# Patient Record
Sex: FEMALE | Race: WHITE | Employment: PART TIME | URBAN - METROPOLITAN AREA
[De-identification: names, ages, dates, MRNs, and addresses within clinical notes are randomized per-mention and may not be internally consistent; named-entity substitution may affect disease eponyms.]

---

## 2017-02-03 ENCOUNTER — TRANSFERRED RECORDS (OUTPATIENT)
Dept: HEALTH INFORMATION MANAGEMENT | Facility: CLINIC | Age: 67
End: 2017-02-03

## 2017-02-10 RX ORDER — ALBUTEROL SULFATE 90 UG/1
2 AEROSOL, METERED RESPIRATORY (INHALATION) EVERY 6 HOURS PRN
COMMUNITY
End: 2021-12-17

## 2017-02-13 ENCOUNTER — APPOINTMENT (OUTPATIENT)
Dept: PHYSICAL THERAPY | Facility: CLINIC | Age: 67
DRG: 470 | End: 2017-02-13
Attending: ORTHOPAEDIC SURGERY
Payer: MEDICARE

## 2017-02-13 ENCOUNTER — HOSPITAL ENCOUNTER (INPATIENT)
Facility: CLINIC | Age: 67
LOS: 3 days | Discharge: HOME | DRG: 470 | End: 2017-02-16
Attending: ORTHOPAEDIC SURGERY | Admitting: ORTHOPAEDIC SURGERY
Payer: MEDICARE

## 2017-02-13 ENCOUNTER — APPOINTMENT (OUTPATIENT)
Dept: GENERAL RADIOLOGY | Facility: CLINIC | Age: 67
DRG: 470 | End: 2017-02-13
Attending: ORTHOPAEDIC SURGERY
Payer: MEDICARE

## 2017-02-13 ENCOUNTER — ANESTHESIA (OUTPATIENT)
Dept: SURGERY | Facility: CLINIC | Age: 67
DRG: 470 | End: 2017-02-13
Payer: MEDICARE

## 2017-02-13 ENCOUNTER — ANESTHESIA EVENT (OUTPATIENT)
Dept: SURGERY | Facility: CLINIC | Age: 67
DRG: 470 | End: 2017-02-13
Payer: MEDICARE

## 2017-02-13 DIAGNOSIS — Z96.659 STATUS POST TOTAL KNEE REPLACEMENT, UNSPECIFIED LATERALITY: Primary | ICD-10-CM

## 2017-02-13 LAB
ANION GAP SERPL CALCULATED.3IONS-SCNC: 6 MMOL/L (ref 3–14)
BUN SERPL-MCNC: 13 MG/DL (ref 7–30)
CALCIUM SERPL-MCNC: 8.7 MG/DL (ref 8.5–10.1)
CHLORIDE SERPL-SCNC: 110 MMOL/L (ref 94–109)
CO2 SERPL-SCNC: 26 MMOL/L (ref 20–32)
CREAT SERPL-MCNC: 0.81 MG/DL (ref 0.52–1.04)
GFR SERPL CREATININE-BSD FRML MDRD: 71 ML/MIN/1.7M2
GLUCOSE SERPL-MCNC: 102 MG/DL (ref 70–99)
HGB BLD-MCNC: 13 G/DL (ref 11.7–15.7)
PLATELET # BLD AUTO: 253 10E9/L (ref 150–450)
POTASSIUM SERPL-SCNC: 4.2 MMOL/L (ref 3.4–5.3)
SODIUM SERPL-SCNC: 142 MMOL/L (ref 133–144)

## 2017-02-13 PROCEDURE — 27810169 ZZH OR IMPLANT GENERAL: Performed by: ORTHOPAEDIC SURGERY

## 2017-02-13 PROCEDURE — 25000128 H RX IP 250 OP 636: Performed by: ANESTHESIOLOGY

## 2017-02-13 PROCEDURE — 25800025 ZZH RX 258: Performed by: ANESTHESIOLOGY

## 2017-02-13 PROCEDURE — 25000125 ZZHC RX 250: Performed by: NURSE ANESTHETIST, CERTIFIED REGISTERED

## 2017-02-13 PROCEDURE — 97530 THERAPEUTIC ACTIVITIES: CPT | Mod: GP | Performed by: PHYSICAL THERAPIST

## 2017-02-13 PROCEDURE — 97110 THERAPEUTIC EXERCISES: CPT | Mod: GP | Performed by: PHYSICAL THERAPIST

## 2017-02-13 PROCEDURE — 36000093 ZZH SURGERY LEVEL 4 1ST 30 MIN: Performed by: ORTHOPAEDIC SURGERY

## 2017-02-13 PROCEDURE — 25000128 H RX IP 250 OP 636: Performed by: ORTHOPAEDIC SURGERY

## 2017-02-13 PROCEDURE — 36415 COLL VENOUS BLD VENIPUNCTURE: CPT | Performed by: ORTHOPAEDIC SURGERY

## 2017-02-13 PROCEDURE — A9270 NON-COVERED ITEM OR SERVICE: HCPCS | Mod: GY | Performed by: ORTHOPAEDIC SURGERY

## 2017-02-13 PROCEDURE — 0QBB0ZZ EXCISION OF RIGHT LOWER FEMUR, OPEN APPROACH: ICD-10-PCS | Performed by: ORTHOPAEDIC SURGERY

## 2017-02-13 PROCEDURE — 93005 ELECTROCARDIOGRAM TRACING: CPT

## 2017-02-13 PROCEDURE — C1776 JOINT DEVICE (IMPLANTABLE): HCPCS | Performed by: ORTHOPAEDIC SURGERY

## 2017-02-13 PROCEDURE — 36000063 ZZH SURGERY LEVEL 4 EA 15 ADDTL MIN: Performed by: ORTHOPAEDIC SURGERY

## 2017-02-13 PROCEDURE — 25000132 ZZH RX MED GY IP 250 OP 250 PS 637: Mod: GY | Performed by: PHYSICIAN ASSISTANT

## 2017-02-13 PROCEDURE — 25000128 H RX IP 250 OP 636: Performed by: NURSE ANESTHETIST, CERTIFIED REGISTERED

## 2017-02-13 PROCEDURE — 25000125 ZZHC RX 250: Performed by: ANESTHESIOLOGY

## 2017-02-13 PROCEDURE — 27810325 ZZHC OR IMPLANT OTHER OPNP: Performed by: ORTHOPAEDIC SURGERY

## 2017-02-13 PROCEDURE — 25000125 ZZHC RX 250: Performed by: ORTHOPAEDIC SURGERY

## 2017-02-13 PROCEDURE — 80048 BASIC METABOLIC PNL TOTAL CA: CPT | Performed by: ORTHOPAEDIC SURGERY

## 2017-02-13 PROCEDURE — 25800025 ZZH RX 258: Performed by: ORTHOPAEDIC SURGERY

## 2017-02-13 PROCEDURE — 0Y9F0ZZ DRAINAGE OF RIGHT KNEE REGION, OPEN APPROACH: ICD-10-PCS | Performed by: ORTHOPAEDIC SURGERY

## 2017-02-13 PROCEDURE — 99222 1ST HOSP IP/OBS MODERATE 55: CPT | Performed by: PHYSICIAN ASSISTANT

## 2017-02-13 PROCEDURE — 0SRC0J9 REPLACEMENT OF RIGHT KNEE JOINT WITH SYNTHETIC SUBSTITUTE, CEMENTED, OPEN APPROACH: ICD-10-PCS | Performed by: ORTHOPAEDIC SURGERY

## 2017-02-13 PROCEDURE — 85049 AUTOMATED PLATELET COUNT: CPT | Performed by: ORTHOPAEDIC SURGERY

## 2017-02-13 PROCEDURE — 37000008 ZZH ANESTHESIA TECHNICAL FEE, 1ST 30 MIN: Performed by: ORTHOPAEDIC SURGERY

## 2017-02-13 PROCEDURE — 93010 ELECTROCARDIOGRAM REPORT: CPT | Performed by: INTERNAL MEDICINE

## 2017-02-13 PROCEDURE — 40000940 XR KNEE PORT RT 1/2 VW: Mod: RT

## 2017-02-13 PROCEDURE — 40000193 ZZH STATISTIC PT WARD VISIT: Performed by: PHYSICAL THERAPIST

## 2017-02-13 PROCEDURE — 85018 HEMOGLOBIN: CPT | Performed by: ORTHOPAEDIC SURGERY

## 2017-02-13 PROCEDURE — A9270 NON-COVERED ITEM OR SERVICE: HCPCS | Mod: GY | Performed by: PHYSICIAN ASSISTANT

## 2017-02-13 PROCEDURE — 71000012 ZZH RECOVERY PHASE 1 LEVEL 1 FIRST HR: Performed by: ORTHOPAEDIC SURGERY

## 2017-02-13 PROCEDURE — 37000009 ZZH ANESTHESIA TECHNICAL FEE, EACH ADDTL 15 MIN: Performed by: ORTHOPAEDIC SURGERY

## 2017-02-13 PROCEDURE — 97161 PT EVAL LOW COMPLEX 20 MIN: CPT | Mod: GP | Performed by: PHYSICAL THERAPIST

## 2017-02-13 PROCEDURE — 27110028 ZZH OR GENERAL SUPPLY NON-STERILE: Performed by: ORTHOPAEDIC SURGERY

## 2017-02-13 PROCEDURE — 27210995 ZZH RX 272: Performed by: ORTHOPAEDIC SURGERY

## 2017-02-13 PROCEDURE — 27210794 ZZH OR GENERAL SUPPLY STERILE: Performed by: ORTHOPAEDIC SURGERY

## 2017-02-13 PROCEDURE — 25000132 ZZH RX MED GY IP 250 OP 250 PS 637: Mod: GY | Performed by: ORTHOPAEDIC SURGERY

## 2017-02-13 PROCEDURE — 12000000 ZZH R&B MED SURG/OB

## 2017-02-13 PROCEDURE — 40000171 ZZH STATISTIC PRE-PROCEDURE ASSESSMENT III: Performed by: ORTHOPAEDIC SURGERY

## 2017-02-13 DEVICE — BONE CEMENT RADIOPAQUE SIMPLEX HV FULL DOSE 6194-1-001: Type: IMPLANTABLE DEVICE | Site: KNEE | Status: FUNCTIONAL

## 2017-02-13 DEVICE — IMP BASEPLATE TIBIAL GENESIS II SZ 3 RT TI 71420182: Type: IMPLANTABLE DEVICE | Site: KNEE | Status: FUNCTIONAL

## 2017-02-13 DEVICE — IMP COMP PATELLA SNR GENESIS II 9X32MM 71420576: Type: IMPLANTABLE DEVICE | Site: KNEE | Status: FUNCTIONAL

## 2017-02-13 DEVICE — IMP COMP FEMORAL S&N LEGION CR NP NARROW 5 RT 71933646: Type: IMPLANTABLE DEVICE | Site: KNEE | Status: FUNCTIONAL

## 2017-02-13 RX ORDER — PROCHLORPERAZINE MALEATE 5 MG
5 TABLET ORAL EVERY 6 HOURS PRN
Status: DISCONTINUED | OUTPATIENT
Start: 2017-02-13 | End: 2017-02-16 | Stop reason: HOSPADM

## 2017-02-13 RX ORDER — FENTANYL CITRATE 50 UG/ML
25-50 INJECTION, SOLUTION INTRAMUSCULAR; INTRAVENOUS
Status: COMPLETED | OUTPATIENT
Start: 2017-02-13 | End: 2017-02-13

## 2017-02-13 RX ORDER — LIDOCAINE 40 MG/G
CREAM TOPICAL
Status: DISCONTINUED | OUTPATIENT
Start: 2017-02-13 | End: 2017-02-16 | Stop reason: HOSPADM

## 2017-02-13 RX ORDER — MAGNESIUM HYDROXIDE 1200 MG/15ML
LIQUID ORAL PRN
Status: DISCONTINUED | OUTPATIENT
Start: 2017-02-13 | End: 2017-02-13 | Stop reason: HOSPADM

## 2017-02-13 RX ORDER — EPHEDRINE SULFATE 50 MG/ML
INJECTION, SOLUTION INTRAMUSCULAR; INTRAVENOUS; SUBCUTANEOUS PRN
Status: DISCONTINUED | OUTPATIENT
Start: 2017-02-13 | End: 2017-02-13

## 2017-02-13 RX ORDER — ONDANSETRON 2 MG/ML
4 INJECTION INTRAMUSCULAR; INTRAVENOUS EVERY 6 HOURS PRN
Status: DISCONTINUED | OUTPATIENT
Start: 2017-02-13 | End: 2017-02-16 | Stop reason: HOSPADM

## 2017-02-13 RX ORDER — ONDANSETRON 4 MG/1
4 TABLET, ORALLY DISINTEGRATING ORAL EVERY 30 MIN PRN
Status: DISCONTINUED | OUTPATIENT
Start: 2017-02-13 | End: 2017-02-13

## 2017-02-13 RX ORDER — ONDANSETRON 2 MG/ML
4 INJECTION INTRAMUSCULAR; INTRAVENOUS EVERY 30 MIN PRN
Status: DISCONTINUED | OUTPATIENT
Start: 2017-02-13 | End: 2017-02-13

## 2017-02-13 RX ORDER — GLYCOPYRROLATE 0.2 MG/ML
INJECTION, SOLUTION INTRAMUSCULAR; INTRAVENOUS PRN
Status: DISCONTINUED | OUTPATIENT
Start: 2017-02-13 | End: 2017-02-13

## 2017-02-13 RX ORDER — DEXTROSE MONOHYDRATE, SODIUM CHLORIDE, AND POTASSIUM CHLORIDE 50; 1.49; 4.5 G/1000ML; G/1000ML; G/1000ML
INJECTION, SOLUTION INTRAVENOUS CONTINUOUS
Status: DISCONTINUED | OUTPATIENT
Start: 2017-02-13 | End: 2017-02-16

## 2017-02-13 RX ORDER — NALOXONE HYDROCHLORIDE 0.4 MG/ML
.1-.4 INJECTION, SOLUTION INTRAMUSCULAR; INTRAVENOUS; SUBCUTANEOUS
Status: DISCONTINUED | OUTPATIENT
Start: 2017-02-13 | End: 2017-02-16 | Stop reason: HOSPADM

## 2017-02-13 RX ORDER — ALPRAZOLAM 0.5 MG
0.5 TABLET ORAL 3 TIMES DAILY PRN
Status: DISCONTINUED | OUTPATIENT
Start: 2017-02-13 | End: 2017-02-13

## 2017-02-13 RX ORDER — AMOXICILLIN 250 MG
1-2 CAPSULE ORAL 2 TIMES DAILY PRN
Status: DISCONTINUED | OUTPATIENT
Start: 2017-02-13 | End: 2017-02-16 | Stop reason: HOSPADM

## 2017-02-13 RX ORDER — ACETAMINOPHEN 325 MG/1
650 TABLET ORAL EVERY 4 HOURS PRN
Status: DISCONTINUED | OUTPATIENT
Start: 2017-02-16 | End: 2017-02-16 | Stop reason: HOSPADM

## 2017-02-13 RX ORDER — AMOXICILLIN 250 MG
1-2 CAPSULE ORAL 2 TIMES DAILY
Status: DISCONTINUED | OUTPATIENT
Start: 2017-02-13 | End: 2017-02-16 | Stop reason: HOSPADM

## 2017-02-13 RX ORDER — DIPHENHYDRAMINE HYDROCHLORIDE 50 MG/ML
12.5 INJECTION INTRAMUSCULAR; INTRAVENOUS EVERY 6 HOURS PRN
Status: DISCONTINUED | OUTPATIENT
Start: 2017-02-13 | End: 2017-02-16 | Stop reason: HOSPADM

## 2017-02-13 RX ORDER — FENTANYL CITRATE 0.05 MG/ML
25-50 INJECTION, SOLUTION INTRAMUSCULAR; INTRAVENOUS
Status: DISCONTINUED | OUTPATIENT
Start: 2017-02-13 | End: 2017-02-13

## 2017-02-13 RX ORDER — SODIUM CHLORIDE, SODIUM LACTATE, POTASSIUM CHLORIDE, CALCIUM CHLORIDE 600; 310; 30; 20 MG/100ML; MG/100ML; MG/100ML; MG/100ML
INJECTION, SOLUTION INTRAVENOUS CONTINUOUS
Status: DISCONTINUED | OUTPATIENT
Start: 2017-02-13 | End: 2017-02-13

## 2017-02-13 RX ORDER — ONDANSETRON 4 MG/1
4 TABLET, ORALLY DISINTEGRATING ORAL EVERY 6 HOURS PRN
Status: DISCONTINUED | OUTPATIENT
Start: 2017-02-13 | End: 2017-02-16 | Stop reason: HOSPADM

## 2017-02-13 RX ORDER — BISACODYL 10 MG
10 SUPPOSITORY, RECTAL RECTAL DAILY PRN
Status: DISCONTINUED | OUTPATIENT
Start: 2017-02-13 | End: 2017-02-16 | Stop reason: HOSPADM

## 2017-02-13 RX ORDER — POLYETHYLENE GLYCOL 3350 17 G/17G
17 POWDER, FOR SOLUTION ORAL DAILY PRN
Status: DISCONTINUED | OUTPATIENT
Start: 2017-02-13 | End: 2017-02-16 | Stop reason: HOSPADM

## 2017-02-13 RX ORDER — ALBUTEROL SULFATE 90 UG/1
2 AEROSOL, METERED RESPIRATORY (INHALATION) EVERY 6 HOURS PRN
Status: DISCONTINUED | OUTPATIENT
Start: 2017-02-13 | End: 2017-02-16 | Stop reason: HOSPADM

## 2017-02-13 RX ORDER — ALPRAZOLAM 0.5 MG
0.5 TABLET ORAL
Status: DISCONTINUED | OUTPATIENT
Start: 2017-02-13 | End: 2017-02-16 | Stop reason: HOSPADM

## 2017-02-13 RX ORDER — CEFAZOLIN SODIUM 1 G/3ML
1 INJECTION, POWDER, FOR SOLUTION INTRAMUSCULAR; INTRAVENOUS SEE ADMIN INSTRUCTIONS
Status: DISCONTINUED | OUTPATIENT
Start: 2017-02-13 | End: 2017-02-13 | Stop reason: HOSPADM

## 2017-02-13 RX ORDER — SODIUM CHLORIDE, SODIUM LACTATE, POTASSIUM CHLORIDE, CALCIUM CHLORIDE 600; 310; 30; 20 MG/100ML; MG/100ML; MG/100ML; MG/100ML
INJECTION, SOLUTION INTRAVENOUS CONTINUOUS
Status: DISCONTINUED | OUTPATIENT
Start: 2017-02-13 | End: 2017-02-13 | Stop reason: HOSPADM

## 2017-02-13 RX ORDER — CYCLOBENZAPRINE HCL 5 MG
5 TABLET ORAL 3 TIMES DAILY PRN
Status: DISCONTINUED | OUTPATIENT
Start: 2017-02-13 | End: 2017-02-16 | Stop reason: HOSPADM

## 2017-02-13 RX ORDER — BUPIVACAINE HYDROCHLORIDE 7.5 MG/ML
INJECTION, SOLUTION INTRASPINAL PRN
Status: DISCONTINUED | OUTPATIENT
Start: 2017-02-13 | End: 2017-02-13

## 2017-02-13 RX ORDER — FENTANYL CITRATE 50 UG/ML
INJECTION, SOLUTION INTRAMUSCULAR; INTRAVENOUS PRN
Status: DISCONTINUED | OUTPATIENT
Start: 2017-02-13 | End: 2017-02-13

## 2017-02-13 RX ORDER — CEFAZOLIN SODIUM 2 G/100ML
2 INJECTION, SOLUTION INTRAVENOUS
Status: COMPLETED | OUTPATIENT
Start: 2017-02-13 | End: 2017-02-13

## 2017-02-13 RX ORDER — AMOXICILLIN 250 MG
1-2 CAPSULE ORAL 2 TIMES DAILY
Status: DISCONTINUED | OUTPATIENT
Start: 2017-02-13 | End: 2017-02-13

## 2017-02-13 RX ORDER — PROPOFOL 10 MG/ML
INJECTION, EMULSION INTRAVENOUS PRN
Status: DISCONTINUED | OUTPATIENT
Start: 2017-02-13 | End: 2017-02-13

## 2017-02-13 RX ORDER — KETOROLAC TROMETHAMINE 15 MG/ML
15 INJECTION, SOLUTION INTRAMUSCULAR; INTRAVENOUS EVERY 6 HOURS
Status: COMPLETED | OUTPATIENT
Start: 2017-02-13 | End: 2017-02-14

## 2017-02-13 RX ORDER — DIPHENHYDRAMINE HCL 12.5MG/5ML
12.5 LIQUID (ML) ORAL EVERY 6 HOURS PRN
Status: DISCONTINUED | OUTPATIENT
Start: 2017-02-13 | End: 2017-02-16 | Stop reason: HOSPADM

## 2017-02-13 RX ORDER — HYDROMORPHONE HYDROCHLORIDE 1 MG/ML
.3-.5 INJECTION, SOLUTION INTRAMUSCULAR; INTRAVENOUS; SUBCUTANEOUS
Status: DISCONTINUED | OUTPATIENT
Start: 2017-02-13 | End: 2017-02-16 | Stop reason: HOSPADM

## 2017-02-13 RX ORDER — OXYCODONE HYDROCHLORIDE 5 MG/1
5-10 TABLET ORAL EVERY 4 HOURS PRN
Status: DISCONTINUED | OUTPATIENT
Start: 2017-02-13 | End: 2017-02-14

## 2017-02-13 RX ORDER — BISACODYL 10 MG
10 SUPPOSITORY, RECTAL RECTAL DAILY PRN
Status: DISCONTINUED | OUTPATIENT
Start: 2017-02-13 | End: 2017-02-13

## 2017-02-13 RX ORDER — ACETAMINOPHEN 325 MG/1
975 TABLET ORAL EVERY 8 HOURS
Status: DISCONTINUED | OUTPATIENT
Start: 2017-02-13 | End: 2017-02-16 | Stop reason: HOSPADM

## 2017-02-13 RX ORDER — LABETALOL HYDROCHLORIDE 5 MG/ML
10 INJECTION, SOLUTION INTRAVENOUS
Status: DISCONTINUED | OUTPATIENT
Start: 2017-02-13 | End: 2017-02-13

## 2017-02-13 RX ORDER — PROPOFOL 10 MG/ML
INJECTION, EMULSION INTRAVENOUS CONTINUOUS PRN
Status: DISCONTINUED | OUTPATIENT
Start: 2017-02-13 | End: 2017-02-13

## 2017-02-13 RX ORDER — CEFAZOLIN SODIUM 1 G/3ML
1 INJECTION, POWDER, FOR SOLUTION INTRAMUSCULAR; INTRAVENOUS EVERY 8 HOURS
Status: COMPLETED | OUTPATIENT
Start: 2017-02-13 | End: 2017-02-14

## 2017-02-13 RX ADMIN — HYDROMORPHONE HYDROCHLORIDE 0.5 MG: 1 INJECTION, SOLUTION INTRAMUSCULAR; INTRAVENOUS; SUBCUTANEOUS at 12:17

## 2017-02-13 RX ADMIN — ACETAMINOPHEN 975 MG: 325 TABLET, FILM COATED ORAL at 22:02

## 2017-02-13 RX ADMIN — POTASSIUM CHLORIDE, DEXTROSE MONOHYDRATE AND SODIUM CHLORIDE: 150; 5; 450 INJECTION, SOLUTION INTRAVENOUS at 14:34

## 2017-02-13 RX ADMIN — PHENYLEPHRINE HYDROCHLORIDE 100 MCG: 10 INJECTION, SOLUTION INTRAMUSCULAR; INTRAVENOUS; SUBCUTANEOUS at 11:27

## 2017-02-13 RX ADMIN — MIDAZOLAM HYDROCHLORIDE 1 MG: 1 INJECTION, SOLUTION INTRAMUSCULAR; INTRAVENOUS at 09:27

## 2017-02-13 RX ADMIN — MIDAZOLAM HYDROCHLORIDE 1 MG: 1 INJECTION, SOLUTION INTRAMUSCULAR; INTRAVENOUS at 09:32

## 2017-02-13 RX ADMIN — ROPIVACAINE HYDROCHLORIDE 15 ML: 5 INJECTION, SOLUTION EPIDURAL; INFILTRATION; PERINEURAL at 09:30

## 2017-02-13 RX ADMIN — SODIUM CHLORIDE, POTASSIUM CHLORIDE, SODIUM LACTATE AND CALCIUM CHLORIDE: 600; 310; 30; 20 INJECTION, SOLUTION INTRAVENOUS at 09:08

## 2017-02-13 RX ADMIN — LIDOCAINE HYDROCHLORIDE 1 ML: 10 INJECTION, SOLUTION EPIDURAL; INFILTRATION; INTRACAUDAL; PERINEURAL at 09:07

## 2017-02-13 RX ADMIN — CEFAZOLIN SODIUM 2 G: 2 INJECTION, SOLUTION INTRAVENOUS at 10:01

## 2017-02-13 RX ADMIN — OXYCODONE HYDROCHLORIDE 10 MG: 5 TABLET ORAL at 18:51

## 2017-02-13 RX ADMIN — SENNOSIDES AND DOCUSATE SODIUM 1 TABLET: 8.6; 5 TABLET ORAL at 20:22

## 2017-02-13 RX ADMIN — PHENYLEPHRINE HYDROCHLORIDE 100 MCG: 10 INJECTION, SOLUTION INTRAMUSCULAR; INTRAVENOUS; SUBCUTANEOUS at 10:51

## 2017-02-13 RX ADMIN — PHENYLEPHRINE HYDROCHLORIDE 100 MCG: 10 INJECTION, SOLUTION INTRAMUSCULAR; INTRAVENOUS; SUBCUTANEOUS at 10:14

## 2017-02-13 RX ADMIN — KETOROLAC TROMETHAMINE 15 MG: 15 INJECTION, SOLUTION INTRAMUSCULAR; INTRAVENOUS at 20:22

## 2017-02-13 RX ADMIN — FENTANYL CITRATE 50 MCG: 50 INJECTION, SOLUTION INTRAMUSCULAR; INTRAVENOUS at 09:33

## 2017-02-13 RX ADMIN — FENTANYL CITRATE 50 MCG: 50 INJECTION, SOLUTION INTRAMUSCULAR; INTRAVENOUS at 09:29

## 2017-02-13 RX ADMIN — BUPIVACAINE HYDROCHLORIDE IN DEXTROSE 13.5 MG: 7.5 INJECTION, SOLUTION SUBARACHNOID at 10:05

## 2017-02-13 RX ADMIN — PROPOFOL 100 MCG/KG/MIN: 10 INJECTION, EMULSION INTRAVENOUS at 10:06

## 2017-02-13 RX ADMIN — FENTANYL CITRATE 50 MCG: 50 INJECTION, SOLUTION INTRAMUSCULAR; INTRAVENOUS at 12:11

## 2017-02-13 RX ADMIN — PHENYLEPHRINE HYDROCHLORIDE 100 MCG: 10 INJECTION, SOLUTION INTRAMUSCULAR; INTRAVENOUS; SUBCUTANEOUS at 11:34

## 2017-02-13 RX ADMIN — Medication 10 MG: at 10:18

## 2017-02-13 RX ADMIN — CEFAZOLIN SODIUM 1 G: 1 INJECTION, POWDER, FOR SOLUTION INTRAMUSCULAR; INTRAVENOUS at 18:51

## 2017-02-13 RX ADMIN — HYDROMORPHONE HYDROCHLORIDE 0.5 MG: 1 INJECTION, SOLUTION INTRAMUSCULAR; INTRAVENOUS; SUBCUTANEOUS at 16:46

## 2017-02-13 RX ADMIN — HYDROMORPHONE HYDROCHLORIDE 0.5 MG: 1 INJECTION, SOLUTION INTRAMUSCULAR; INTRAVENOUS; SUBCUTANEOUS at 12:29

## 2017-02-13 RX ADMIN — TRANEXAMIC ACID 1 G: 100 INJECTION, SOLUTION INTRAVENOUS at 11:17

## 2017-02-13 RX ADMIN — OXYCODONE HYDROCHLORIDE 10 MG: 5 TABLET ORAL at 22:54

## 2017-02-13 RX ADMIN — PHENYLEPHRINE HYDROCHLORIDE 100 MCG: 10 INJECTION, SOLUTION INTRAMUSCULAR; INTRAVENOUS; SUBCUTANEOUS at 11:45

## 2017-02-13 RX ADMIN — PHENYLEPHRINE HYDROCHLORIDE 100 MCG: 10 INJECTION, SOLUTION INTRAMUSCULAR; INTRAVENOUS; SUBCUTANEOUS at 10:27

## 2017-02-13 RX ADMIN — FENTANYL CITRATE 100 MCG: 50 INJECTION, SOLUTION INTRAMUSCULAR; INTRAVENOUS at 10:01

## 2017-02-13 RX ADMIN — Medication 5 MG: at 10:23

## 2017-02-13 RX ADMIN — ALPRAZOLAM 0.5 MG: 0.5 TABLET ORAL at 20:22

## 2017-02-13 RX ADMIN — ACETAMINOPHEN 975 MG: 325 TABLET, FILM COATED ORAL at 14:31

## 2017-02-13 RX ADMIN — SODIUM CHLORIDE, POTASSIUM CHLORIDE, SODIUM LACTATE AND CALCIUM CHLORIDE: 600; 310; 30; 20 INJECTION, SOLUTION INTRAVENOUS at 11:05

## 2017-02-13 RX ADMIN — GLYCOPYRROLATE 0.2 MG: 0.2 INJECTION, SOLUTION INTRAMUSCULAR; INTRAVENOUS at 10:28

## 2017-02-13 RX ADMIN — KETOROLAC TROMETHAMINE 15 MG: 15 INJECTION, SOLUTION INTRAMUSCULAR; INTRAVENOUS at 14:32

## 2017-02-13 RX ADMIN — HYDROMORPHONE HYDROCHLORIDE 0.5 MG: 1 INJECTION, SOLUTION INTRAMUSCULAR; INTRAVENOUS; SUBCUTANEOUS at 22:02

## 2017-02-13 RX ADMIN — HYDROMORPHONE HYDROCHLORIDE 0.5 MG: 1 INJECTION, SOLUTION INTRAMUSCULAR; INTRAVENOUS; SUBCUTANEOUS at 14:32

## 2017-02-13 ASSESSMENT — ENCOUNTER SYMPTOMS: SEIZURES: 0

## 2017-02-13 ASSESSMENT — LIFESTYLE VARIABLES: TOBACCO_USE: 1

## 2017-02-13 NOTE — PLAN OF CARE
Problem: Goal Outcome Summary  Goal: Goal Outcome Summary  PT: Orders received, evaluation completed, and treatment initiated. Patient is a 67 y/o female POD # 0 R TKA. Patient lives with her significant other in a house with no stairs to enter/exit or navigate house. Patient completed functional mobility independently without use of an assistive device at baseline. Patient owns a walker and cane.      Surgeon Discharge Plan: None documented. Per patient report, plan for discharge home with OP PT.      Current Functional Status: Patient performed supine <> sit, standby assist. Sit <> stand with walker and contact guard assist. Patient ambulated 30 feet and 10 feet with walker and contact guard assist, noted no R knee buckling with no KI donned. R knee ROM: 10-78 degrees.      Barriers to Plan/Home: None indicated at this time.

## 2017-02-13 NOTE — OP NOTE
PATIENT NAME:  Erika Hines  MEDICAL RECORD #: [id number]  PATIENT BIRTHDAY:  1950  DATE OF SURGERY: 2/13/2017    SURGEON:    Gorge Williamson MD    1st ASSISTANT:  SARAH Lechuga OPA-C    PREOPERATIVE DIAGNOSIS:  Degenerative osteoarthritis right knee.    POSTOPERATIVE DIAGNOSIS:  Degenerative osteoarthritis right knee.    PROCEDURE: right total knee arthroplasty.    COMPONENTS: [employer] - Size 5N CR femoral component, size 3 fully stemmed tibial baseplate with 10 mm CR XLPE high flexion articular insert, and 32 mm patellar component.    ANESTHESIA: Spinal     INDICATIONS FOR PROCEDURE:  The patient was brought to the operating room for elective total knee replacement for advanced degenerative osteoarthritis.  The patient received IV antibiotics preoperatively.  These will be continued for 24 hours.  The patient also will receive anticoagulants  for postoperative thrombosis prophylaxis.  The patient understands the indications, alternatives, risks, benefits, and time involved for recovery and wishes to proceed.  The patient is consented for the procedure.      DESCRIPTION OF PROCEDURE:  The patient was brought to the operating room  and following suitable Spinal anesthesia, the right knee was prepped and draped in the usual manner.  Full timeout was carried out and the patient and proper extremity and operative site identified and confirmed by all members of the operative team.    We next exsanguinated the operative leg with a Gera bandage and the tourniquet was elevated to 350 mmHg on the thigh.    A 10 cm longitudinal incision was made anteriorly for the MIS technique.  Sharp dissection was carried down through the subcutaneous tissue.  A median parapatellar arthrotomy was carried out and the knee flexed to 90 degrees.    There was severe wear in the medial compartment and moderate wear in the patellofemoral  compartment and moderate wear in the lateral compartment.    The [employer]hew  instrumentation was used.  The femur was cut for a size 5N CR  implant.  The tibia was cut for a size 3 fully stemmed base plate.  The patella was cut for a 32 mm patellar button.    There was a very large intraosseus cyst in The lateral femoral condyle in the patellofemoral region.  It was opened and curetted out.    There was also a large popliteal cyst which was evacuated.    The trial components were removed from the knee.  The bone surfaces were prepared with jet lavage and careful drying technique.     The posterior capsule was injected for postoperative relief with 30 cc of saline, 30 cc of 0.2% Ropivacaine, and 15 mg of Toradol.       We then cemented the components with HD Simplex cement beginning with the tibial baseplate, followed by the femoral component, followed by the patellar component.    The knee was held in extension with the trial insert in place in the tibia until the cement was set.  Once the cement was set up, the trial component was removed.  We selected the 10 mm cr XLPE high flexion articular insert.  This insert was secured and the knee checked one final time for motion, stability, alignment and soft tissue balance, all of which were excellent.    The wound was irrigated throughout with antibiotic solution using jet lavage.  The tourniquet was deflated prior to wound closure and all bleeding controlled with electrocautery.  We then re-exsanguinated the leg and reinflated the tourniquet during wound closure.    The wound was closed over a medium Hemovac drain with spaced 0 Ethibond interrupted and V-Loc running suture in the parapatellar arthrotomy, 2-0 undyed Vicryl in subcutaneous tissue and skin staples in the skin.  A sterile soft dressing was applied.  The tourniquet deflated one final time.  The patient was taken to the [facility] in satisfactory condition.  There were no known complications during the procedure.    A surgical assistant was medically necessary for this procedure for pre-op  positioning as well as intra-op retraction and extremity support and positioning.  He was present for the entire procedure.      SHON GOTTI MD    CC  Shon Gotti MD          [phone] Fax

## 2017-02-13 NOTE — ANESTHESIA POSTPROCEDURE EVALUATION
Patient: Erika Hines    Procedure(s):  RIGHT TOTAL KNEE ARTHROPLASTY  - Wound Class: I-Clean    Diagnosis:djd   Diagnosis Additional Information: No value filed.    Anesthesia Type:  Spinal, Periph. Nerve Block for postop pain    Note:  Anesthesia Post Evaluation    Patient location during evaluation: PACU  Patient participation: Able to fully participate in evaluation  Level of consciousness: awake  Pain management: adequate  Airway patency: patent  Cardiovascular status: acceptable  Respiratory status: acceptable  Hydration status: acceptable  PONV: controlled     Anesthetic complications: None          Last vitals:  Vitals:    02/13/17 1211 02/13/17 1220 02/13/17 1230   BP:  100/58 96/58   Pulse:      Resp:  11 12   Temp:      SpO2: 98%  98%         Electronically Signed By: Abhi Knutson MD  February 13, 2017  12:48 PM

## 2017-02-13 NOTE — PLAN OF CARE
Problem: Goal Outcome Summary  Goal: Goal Outcome Summary  Outcome: Improving  Pain well controlled with IV Dilaudid, dressing clean, dry and intact, CMS is WNL, VSS, taking food and fluids without nausea.Got OOB with PT and tolerated well, still DTV.

## 2017-02-13 NOTE — IP AVS SNAPSHOT
[address] [facility]    [address] [place] [address]    Phone:  [phone]                                       After Visit Summary   2/13/2017    Erika Hines    MRN: [id number]           After Visit Summary Signature Page     I have received my discharge instructions, and my questions have been answered. I have discussed any challenges I see with this plan with the nurse or doctor.    ..........................................................................................................................................  Patient/Patient Representative Signature      ..........................................................................................................................................  Patient Representative Print Name and Relationship to Patient    ..................................................               ................................................  Date                                            Time    ..........................................................................................................................................  Reviewed by Signature/Title    ...................................................              ..............................................  Date                                                            Time

## 2017-02-13 NOTE — ANESTHESIA PROCEDURE NOTES
Peripheral nerve/Neuraxial procedure note : Adductor canal and Femoral  Pre-Procedure  Performed by SHON LOMELI  Location: pre-op      Pre-Anesthestic Checklist: patient identified, IV checked, site marked, risks and benefits discussed, informed consent, monitors and equipment checked, pre-op evaluation, at physician/surgeon's request and post-op pain management    Timeout  Correct Patient: Yes   Correct Procedure: Yes   Correct Site: Yes   Correct Laterality: Yes   Correct Position: Yes   Site Marked: Yes   .   Procedure Documentation    Diagnosis:DJD.    Procedure:    Adductor canal and Femoral.  Local skin infiltrated with 2 mL of 1% lidocaine.     Ultrasound used to identify targeted nerve, plexus, or vascular marker and placed a needle adjacent to it. A permanent image is entered into the patient's record.  Patient Prep;mask, sterile gloves, chlorhexidine gluconate and isopropyl alcohol, patient draped.  .  Needle: insulated (). . Spinal Needle: . . Insertion Method: Single Shot.     Assessment/Narrative  Paresthesias: No.  Injection made incrementally with aspirations every 5 mL..  The placement was negative for: blood aspirated, painful injection and site bleeding.  Bolus given via needle..   Secured via.   Complications: none. Comments:  Patient tolerated the procedure well

## 2017-02-13 NOTE — CONSULTS
HOSPITALIST CONSULTATION      REQUESTING PHYSICIAN:  Gorge Williamson MD      REASON FOR CONSULTATION:  Hospitalist consult.      HISTORY OF PRESENT ILLNESS:  Erika Hines is a 66-year-old female with a past medical history significant for obstructive sleep apnea, history of cervical cancer status post hysterectomy, depression, anxiety, hyperlipidemia and history of multiple small-bowel obstructions who is postoperative day 0 status post right total knee arthroplasty for degenerative osteoarthritis.  The procedure was performed by Dr. Williamson under spinal anesthesia with peripheral nerve block with an estimated blood loss of 10 mL.  The patient tolerated the procedure without intraoperative complication.  Hospitalist Service was requested for postoperative medical co-management.  The patient is presently evaluated in her room on the orthopedic floor.  She reports she is feeling well at present and rates her pain a 5/10.  She denies any chest pain, shortness of breath, nausea, vomiting, visual changes or focal weakness.  She tells me that she uses her CPAP nightly for sleep apnea.  After her hysterectomy, she developed several small-bowel obstructions per her report and ultimately underwent a partial small-bowel resection in 2001.  She states that since that time she has not had additional trouble with bowel obstructions.      REVIEW OF SYSTEMS:  A 10-point review was conducted and is negative aside from the information in the HPI.      PAST MEDICAL HISTORY:   1.  Sleep apnea, on nightly CPAP.   2.  Idiopathic sleep-related nonobstructive alveolar hypoventilation.   3.  History of cervical cancer status post hysterectomy.  Patient has been on p.o. estrogen for approximately 20 years.   4.  Hyperlipidemia.   5.  Depression.   6.  Chronic fatigue syndrome.   7.  Arthritis.   8.  History of small-bowel obstructions.  The patient tells me she has had multiple bowel obstructions following her hysterectomy.  She had a partial  small-bowel obstruction in 2001.      PAST SURGICAL HISTORY:   1.  Hysterectomy.   2.  Bilateral breast implants.   3.  Appendectomy.   4.  Partial small-bowel resection in 2001.      ALLERGIES:   1.  Codeine -- nausea, vomiting.   2.  Remeron.   3.  Zoloft.      PRIOR TO ADMISSION MEDICATIONS:   1.  Albuterol 2 puffs into lungs q. 6 hours p.r.n. for wheezing or shortness of breath.   2.  Xanax 0.5 mg by mouth t.i.d. p.r.n.  The patient is taking this differently.  She takes 1 tablet by mouth at bedtime.   3.  Celexa 30 mg by mouth every morning.   4.  Vitamin B12 at 1000 mcg by mouth daily.   5.  Estradiol 1 mg by mouth at bedtime.   6.  Ibuprofen 400 mg by mouth daily p.r.n. for pain.   7.  Claritin D 1 tablet by mouth 2 times daily.   8.  Omega 3 fatty acids 1 capsule by mouth every morning.   9.  Probiotic 1 capsule by mouth daily.   10.  Vitamin D 1000 units by mouth daily.      SOCIAL HISTORY:  The patient is a current everyday smoker.  She smokes approximately 2 cigars per day.  She also has a cocktail nightly with her fianc?.      LABORATORY EVALUATION:  BMP is within normal limits with creatinine of 0.81.  Hemoglobin 13.0.  Blood sugar is 102.      PHYSICAL EXAMINATION:   VITAL SIGNS:  Temperature 97.9, heart rate 67, blood pressure 99/61, respiratory rate 16, oxygen saturation is 95% on 2.5 liters nasal cannula.   GENERAL:  Alert and oriented female sitting up in bed, appears comfortable and is pleasantly conversant.   EYES:  Pupils equal and reactive to light, EOMI.   EARS, NOSE, AND THROAT:  Mucous membranes are moist.   CARDIOVASCULAR:  Regular rate and rhythm, no murmurs appreciated.   RESPIRATORY:  I do appreciate some fine crackles in the base of the right lung.  Lungs are otherwise clear to auscultation without increased work of breathing or wheezing.   GASTROINTESTINAL:  Positive bowel sounds.  Abdomen is soft, nontender to palpation.   EXTREMITIES:  No significant lower extremity edema.  PCDs are  in place.   NEUROLOGIC:  Cranial nerves II-XII are grossly intact.  No focal deficits.      ASSESSMENT AND PLAN:  Erika Hines is a 66-year-old female with a past medical history significant for sleep apnea, history of cervical cancer status post hysterectomy, on chronic estrogen therapy, depression, chronic fatigue, hyperlipidemia and history of recurrent small-bowel obstructions status post partial small-bowel resection who is postoperative day 0 status post right total knee arthroplasty.  Hospitalist Service was consulted for postoperative medical co-management.   1.  Postoperative day 0 status post right total knee arthroplasty.  The patient tolerated the procedure well without complication and continues to do well postoperatively.  Estimated blood loss was 10 mL.   -- Primary management is per Orthopedic Service, including deep venous thrombosis prophylaxis and pain control.   -- Lovenox 40 mg subcutaneously q. 24 hours scheduled to begin on 02/14.   -- D5 one-half normal saline plus 20 mEq KCl at 75 mL per hour.  This should be discontinued when patient tolerating adequate p.o. intake.   -- Creatinine is pending for this afternoon and hemoglobin ordered for the morning.   -- Physical therapy, occupational therapy.   2.  History of cervical cancer status post hysterectomy, on chronic estrogen therapy.  The patient takes 1 mg of p.o. estradiol nightly and has been taking this for approximately 20 years.  Patient is also a current everyday cigar smoker, and certainly, estrogen therapy and smoking would put the patient at higher risk of deep venous thrombosis.  I did discuss this with the patient and her fiance and did recommend holding her oral estrogen until she is ambulatory and mobile.  The patient would prefer to remain on her estradiol and understands the risks of this.   -- Continue Lovenox per primary service.   3.  Depression.  We will continue the patient's prior to admission Celexa, as well as her Xanax.   She typically takes Xanax once before bedtime rather than t.i.d. p.r.n.  Will change the order to reflect this.   4.  Sleep apnea.  CPAP is ordered per home settings.  I would recommend continuous pulse oximetry overnight during hospitalization.   5.  History of small-bowel obstruction.  The patient reports several small-bowel obstructions following her hysterectomy.   -- Bowel regimen is in place.      The patient is medically stable at this time, Hospitalist Service will sign off.  Please do not hesitate to call with any questions or concerns.  We would like to thank the Orthopedic Service for their consultation.      The patient was seen and discussed with Dr. Lorri Tom, who agrees with the above plan.         LORRI TOM MD       As dictated by ALIA ROMERO PA-C            D: 2017 14:58   T: 2017 15:45   MT: JESUS ALBERTO      Name:     GUSTAVO MICHAEL   MRN:      [id number]        Account:       [id number]   :      1950           Consult Date:  2017      Document: [id number]       cc: Gorge Williamson MD

## 2017-02-13 NOTE — ANESTHESIA PREPROCEDURE EVALUATION
Procedure: Procedure(s):  ARTHROPLASTY KNEE  Preop diagnosis: djd     Allergies   Allergen Reactions     Codeine Nausea and Vomiting     Remeron [Mirtazapine] Unknown     Zoloft [Sertraline] Other (See Comments)     MANIC symptoms     Past Medical History   Diagnosis Date     Arthritis      Chronic fatigue syndrome      Depression      High cholesterol      Idiopathic sleep related nonobstructive alveolar hypoventilation      Malignant neoplasm of uterus (H)      Sleep apnea      Past Surgical History   Procedure Laterality Date     Breast surgery       bilateral breast implants     Gyn surgery       hysterectomy     Abdomen surgery       small bowel resection 2001     Appendectomy       Prior to Admission medications    Medication Sig Start Date End Date Taking? Authorizing Provider   Omega-3 Fatty Acids (FISH OIL PO) Take 1 capsule by mouth every morning   Yes Reported, Patient   Ibuprofen (ADVIL PO) Take 400 mg by mouth daily as needed for moderate pain   Yes Reported, Patient   albuterol (PROAIR HFA/PROVENTIL HFA/VENTOLIN HFA) 108 (90 BASE) MCG/ACT Inhaler Inhale 2 puffs into the lungs every 6 hours as needed for shortness of breath / dyspnea or wheezing   Yes Reported, Patient   ALPRAZolam (XANAX PO) Take 0.5 mg by mouth 3 times daily as needed for anxiety   Yes Reported, Patient   VITAMIN D, CHOLECALCIFEROL, PO Take 1,000 Units by mouth daily   Yes Reported, Patient   Citalopram Hydrobromide (CELEXA PO) Take 30 mg by mouth every morning (Takes 1.5 x 20mg tablet = 30mg)   Yes Reported, Patient   Cyanocobalamin (VITAMIN B 12 PO) Take 1,000 mcg by mouth daily   Yes Reported, Patient   Estradiol (ESTRACE PO) Take 1 mg by mouth At Bedtime    Yes Reported, Patient   Probiotic Product (PROBIOTIC PO) Take 1 capsule by mouth daily    Yes Reported, Patient   loratadine-pseudoePHEDrine (CLARITIN-D 12-HOUR) 5-120 MG per 12 hr tablet Take 1 tablet by mouth 2 times daily as needed for allergies    Yes Reported, Patient      Current Facility-Administered Medications Ordered in Epic   Medication Dose Route Frequency Last Rate Last Dose     ceFAZolin sodium-dextrose (ANCEF) infusion 2 g  2 g Intravenous Pre-Op/Pre-procedure x 1 dose         ceFAZolin (ANCEF) 1 g vial to attach to  ml bag for ADULT or 50 ml bag for PEDS  1 g Intravenous See Admin Instructions         tranexamic acid (CYKLOKAPRON) 1 g in NaCl 0.9 % 60 mL bolus  1 g Intravenous Once         lidocaine 1 % 1 mL  1 mL Other Q1H PRN         lactated ringers infusion   Intravenous Continuous         No current [facility]-ordered outpatient prescriptions on file.     Wt Readings from Last 1 Encounters:   02/13/17 70.3 kg (155 lb)     Temp Readings from Last 1 Encounters:   02/13/17 36.2  C (97.2  F) (Temporal)     BP Readings from Last 6 Encounters:   02/13/17 101/67     Pulse Readings from Last 4 Encounters:   02/13/17 66     Resp Readings from Last 1 Encounters:   02/13/17 14     SpO2 Readings from Last 1 Encounters:   02/13/17 97%       Recent Labs   Lab Test  02/13/17   0822   HGB  13.0       RECENT LABS:   ECG:   ECHO:   CXR:        Anesthesia Evaluation     . Pt has had prior anesthetic.     No history of anesthetic complications     ROS/MED HX    ENT/Pulmonary:     (+)sleep apnea, tobacco use, Current use uses CPAP , . .    Neurologic:      (-) seizures and CVA   Cardiovascular:     (+) Dyslipidemia, ----. : . . . :. .       METS/Exercise Tolerance:     Hematologic:         Musculoskeletal:   (+) arthritis, , , -       GI/Hepatic:        (-) GERD and liver disease   Renal/Genitourinary:      (-) renal disease   Endo:      (-) Type II DM, thyroid disease and chronic steroid usage   Psychiatric:     (+) psychiatric history anxiety and depression      Infectious Disease:        (-) Recent Fever   Malignancy:         Other:               Physical Exam  Normal systems: cardiovascular, pulmonary and dental    Airway   Mallampati: II  TM distance: >3 FB  Neck ROM:  full    Dental     Cardiovascular       Pulmonary                     Anesthesia Plan      History & Physical Review  History and physical reviewed and following examination; no interval change.    ASA Status:  3 .    NPO Status:  > 8 hours    Plan for Spinal and Periph. Nerve Block for postop pain          Postoperative Care  Postoperative pain management:  Multi-modal analgesia.      Consents  Anesthetic plan, risks, benefits and alternatives discussed with:  Patient..        Procedure: Procedure(s):  ARTHROPLASTY KNEE  Preop diagnosis: djd     Allergies   Allergen Reactions     Codeine Nausea and Vomiting     Remeron [Mirtazapine] Unknown     Zoloft [Sertraline] Other (See Comments)     MANIC symptoms     Past Medical History   Diagnosis Date     Arthritis      Chronic fatigue syndrome      Depression      High cholesterol      Idiopathic sleep related nonobstructive alveolar hypoventilation      Malignant neoplasm of uterus (H)      Sleep apnea      Past Surgical History   Procedure Laterality Date     Breast surgery       bilateral breast implants     Gyn surgery       hysterectomy     Abdomen surgery       small bowel resection 2001     Appendectomy       Prior to Admission medications    Medication Sig Start Date End Date Taking? Authorizing Provider   Omega-3 Fatty Acids (FISH OIL PO) Take 1 capsule by mouth every morning   Yes Reported, Patient   Ibuprofen (ADVIL PO) Take 400 mg by mouth daily as needed for moderate pain   Yes Reported, Patient   albuterol (PROAIR HFA/PROVENTIL HFA/VENTOLIN HFA) 108 (90 BASE) MCG/ACT Inhaler Inhale 2 puffs into the lungs every 6 hours as needed for shortness of breath / dyspnea or wheezing   Yes Reported, Patient   ALPRAZolam (XANAX PO) Take 0.5 mg by mouth 3 times daily as needed for anxiety   Yes Reported, Patient   VITAMIN D, CHOLECALCIFEROL, PO Take 1,000 Units by mouth daily   Yes Reported, Patient   Citalopram Hydrobromide (CELEXA PO) Take 30 mg by mouth every morning  (Takes 1.5 x 20mg tablet = 30mg)   Yes Reported, Patient   Cyanocobalamin (VITAMIN B 12 PO) Take 1,000 mcg by mouth daily   Yes Reported, Patient   Estradiol (ESTRACE PO) Take 1 mg by mouth At Bedtime    Yes Reported, Patient   Probiotic Product (PROBIOTIC PO) Take 1 capsule by mouth daily    Yes Reported, Patient   loratadine-pseudoePHEDrine (CLARITIN-D 12-HOUR) 5-120 MG per 12 hr tablet Take 1 tablet by mouth 2 times daily as needed for allergies    Yes Reported, Patient     Current Facility-Administered Medications Ordered in Epic   Medication Dose Route Frequency Last Rate Last Dose     ceFAZolin sodium-dextrose (ANCEF) infusion 2 g  2 g Intravenous Pre-Op/Pre-procedure x 1 dose   2 g at 02/13/17 0908     ceFAZolin (ANCEF) 1 g vial to attach to  ml bag for ADULT or 50 ml bag for PEDS  1 g Intravenous See Admin Instructions         tranexamic acid (CYKLOKAPRON) 1 g in NaCl 0.9 % 60 mL bolus  1 g Intravenous Once         lidocaine 1 % 1 mL  1 mL Other Q1H PRN   1 mL at 02/13/17 0907     lactated ringers infusion   Intravenous Continuous 25 mL/hr at 02/13/17 0908       No current [facility]-ordered outpatient prescriptions on file.     Wt Readings from Last 1 Encounters:   02/13/17 70.3 kg (155 lb)     Temp Readings from Last 1 Encounters:   02/13/17 36.2  C (97.2  F) (Temporal)     BP Readings from Last 6 Encounters:   02/13/17 101/67     Pulse Readings from Last 4 Encounters:   02/13/17 66     Resp Readings from Last 1 Encounters:   02/13/17 14     SpO2 Readings from Last 1 Encounters:   02/13/17 97%     Recent Labs   Lab Test  02/13/17   0822   NA  142   POTASSIUM  4.2   CHLORIDE  110*   CO2  26   ANIONGAP  6   GLC  102*   BUN  13   CR  0.81   INDIRA  8.7     Recent Labs   Lab Test  02/13/17   0822   HGB  13.0     No results for input(s): INR in the last 07982 hours.    Invalid input(s): APTT   RECENT LABS:   ECG:   ECHO:   CXR:                      .

## 2017-02-13 NOTE — ANESTHESIA CARE TRANSFER NOTE
Patient: Erika Hines    Procedure(s):  RIGHT TOTAL KNEE ARTHROPLASTY  - Wound Class: I-Clean    Diagnosis: djd   Diagnosis Additional Information: No value filed.    Anesthesia Type:   Spinal, Periph. Nerve Block for postop pain     Note:  Airway :Face Mask  Patient transferred to:PACU  Comments: A&O x 3.  Report to RN.      Vitals: (Last set prior to Anesthesia Care Transfer)    CRNA VITALS  2/13/2017 1124 - 2/13/2017 1207      2/13/2017             Resp Rate (set): 10                Electronically Signed By: Larissa Thomas  February 13, 2017  12:07 PM

## 2017-02-13 NOTE — IP AVS SNAPSHOT
MRN:[id number]                      After Visit Summary   2/13/2017    Erika Hines    MRN: [id number]           Thank you!     Thank you for choosing [facility] for your care. Our goal is always to provide you with excellent care. Hearing back from our patients is one way we can continue to improve our services. Please take a few minutes to complete the written survey that you may receive in the mail after you visit with us. Thank you!        Patient Information     Date Of Birth          1950        About your hospital stay     You were admitted on:  February 13, 2017 You last received care in the:  [place] [address] [facility]    You were discharged on:  February 16, 2017        Reason for your hospital stay       TKA                  Who to Call     For medical emergencies, please call 911.  For non-urgent questions about your medical care, please call your primary care provider or clinic, [phone]  For questions related to your surgery, please call your surgery clinic        Attending Provider     Provider Shon Manley MD Orthopedics       Primary Care Provider Office Phone # Fax #    Anitha Rock [phone]       [facility] [address]  [facility] [address]        Follow-up Appointments     Follow-up and recommended labs and tests        Office visit prearranged                  Further instructions from your care team       DISCHARGE INSTRUCTIONS AFTER YOUR TOTAL KNEE REPLACEMENT     SHON GOTTI MD       Instructions to care for your wound at home:   Change the dressing daily.  Inspect your incision at the time of dressing change for increased redness, tenderness, swelling, or drainage along the incision line.  Some bruising or discoloration is usually present, but call my office for any changes in appearance that concern you.  Also call if you develop a fever above 101 degrees.     You may shower  directly over the wound beginning 4 days after your surgery, but do not submerge the wound under water until after your post operative visit when the sutures are removed and the wound is completely sealed without drainage.    Activities:  Physical activity may be resumed gradually according to your comfort level. You may bear weight on your operative leg as tolerated with your crutches or walker as instructed by your therapist.  Follow your home exercise program.  Ice your knee after exercising.        Wear your knee immobilizer at night only until your office visit in 2 weeks to maintain full knee extension.  Do not use the immobilizer during the day unless otherwise instructed.      Wear the anti-embolism stockings day and night until seen in the office for your post operative visit. Remove them twice daily for one hour at a time. Keep the compression stockings flat on your leg.  Do not allow them to roll up or crease your skin.  Call if you develop calf pain.     Outpatient Physical Therapy and home exercises:  Outpatient physical therapy visits are required following discharge from the hospital. The referral for these outpatient therapy visits is routinely given to you at the time of your surgical scheduling. You should have already scheduled your therapy sessions in advance.  If you have not done so, please immediately call the therapy site of your choice to schedule the physical therapy regimen that has been prescribed for you.  You may discontinue the crutches or walker per the therapist's recommendation.      Medications:  New medications for you on discharge will include a pain medication, a stool softener while on the narcotic pain medication, and a blood thinner.  Detailed instructions will come with those medications.  You will also receive instructions on when to resume your home medications.     If you routinely take Aspirin 81 mg, hold the Aspirin while taking the formal blood thinner medication. Then  "take Aspirin 325 mg (1 tablet) daily for 4 weeks.  Then resume your Aspirin 81 mg daily if that is your routine.        Antibiotic coverage will be needed before any type of dental procedure.  This is a life long recommendation.  You should notify your dentist of your total knee surgery and call your dentist or our office one week before a dental appointment for antibiotics.        Clinic follow-up appointment:  Your clinic follow-up appointment has been prearranged.  Call [phone] with any questions.    Gorge Williamson MD     Pending Results     No orders found from 2017 to 2017.            Statement of Approval     Ordered          02/15/17 1522  I have reviewed and agree with all the recommendations and orders detailed in this document.  EFFECTIVE NOW     Approved and electronically signed by:  Gorge Williamson MD             Admission Information     Date & Time Provider Department Dept. Phone    2017 Gorge Williamson MD [place] [address] [facility] [phone]      Your Vitals Were     Blood Pressure Pulse Temperature Respirations Height Weight    144/67 (BP Location: Left arm) 53 97.7  F (36.5  C) (Oral) 16 1.664 m (5' 5.5\") 70.3 kg (155 lb)    Pulse Oximetry BMI (Body Mass Index)                95% 25.4 kg/m2          [employer] Information     [employer] lets you send messages to your doctor, view your test results, renew your prescriptions, schedule appointments and more. To sign up, go to [email or web address] . Click on \"Log in\" on the left side of the screen, which will take you to the Welcome page. Then click on \"Sign up Now\" on the right side of the page.     You will be asked to enter the access code listed below, as well as some personal information. Please follow the directions to create your username and password.     Your access code is: [email or web address]  Expires: 2017  7:06 AM     Your access code will  in 90 days. If you need help or a new " code, please call your [facility] clinic or [phone].        Care EveryWhere ID     This is your Care EveryWhere ID. This could be used by other organizations to access your [facility] medical records  [id number]           Review of your medicines      START taking        Dose / Directions    enoxaparin 40 MG/0.4ML injection   Commonly known as:  LOVENOX        Dose:  40 mg   Inject 0.4 mLs (40 mg) Subcutaneous every 24 hours for 10 days   Quantity:  4 mL   Refills:  0       HYDROmorphone 2 MG tablet   Commonly known as:  DILAUDID        Dose:  2-4 mg   Take 1-2 tablets (2-4 mg) by mouth every 3 hours as needed for moderate to severe pain   Quantity:  40 tablet   Refills:  0       senna-docusate 8.6-50 MG per tablet   Commonly known as:  SENOKOT-S;PERICOLACE        Dose:  1-2 tablet   Take 1-2 tablets by mouth 2 times daily   Quantity:  50 tablet   Refills:  0         CONTINUE these medicines which have NOT CHANGED        Dose / Directions    ADVIL PO   Notes to Patient:  Resume per home regimen        Dose:  400 mg   Take 400 mg by mouth daily as needed for moderate pain   Refills:  0       albuterol 108 (90 BASE) MCG/ACT Inhaler   Commonly known as:  PROAIR HFA/PROVENTIL HFA/VENTOLIN HFA   Notes to Patient:  Resume per home regimen        Dose:  2 puff   Inhale 2 puffs into the lungs every 6 hours as needed for shortness of breath / dyspnea or wheezing   Refills:  0       CELEXA PO        Dose:  30 mg   Take 30 mg by mouth every morning (Takes 1.5 x 20mg tablet = 30mg)   Refills:  0       ESTRACE PO   Notes to Patient:  Resume per home regimen        Dose:  1 mg   Take 1 mg by mouth At Bedtime   Refills:  0       FISH OIL PO   Notes to Patient:  Resume per home regimen        Dose:  1 capsule   Take 1 capsule by mouth every morning   Refills:  0       loratadine-pseudoePHEDrine 5-120 MG per 12 hr tablet   Commonly known as:  CLARITIN-D 12-hour   Notes to Patient:  Resume per home regimen        Dose:  1 tablet    Take 1 tablet by mouth 2 times daily as needed for allergies   Refills:  0       PROBIOTIC PO   Notes to Patient:  Resume per home regimen        Dose:  1 capsule   Take 1 capsule by mouth daily   Refills:  0       VITAMIN B 12 PO   Notes to Patient:  Resume per home regimen        Dose:  1000 mcg   Take 1,000 mcg by mouth daily   Refills:  0       VITAMIN D (CHOLECALCIFEROL) PO   Notes to Patient:  Resume per home regimen        Dose:  1000 Units   Take 1,000 Units by mouth daily   Refills:  0       XANAX PO   Notes to Patient:  Resume per home regimen        Dose:  0.5 mg   Take 0.5 mg by mouth 3 times daily as needed for anxiety   Refills:  0            Where to get your medicines      These medications were sent to [facility] - [address]  [address], [place] [address]     Phone:  [phone]     enoxaparin 40 MG/0.4ML injection    senna-docusate 8.6-50 MG per tablet         Some of these will need a paper prescription and others can be bought over the counter. Ask your nurse if you have questions.     Bring a paper prescription for each of these medications     HYDROmorphone 2 MG tablet                Protect others around you: Learn how to safely use, store and throw away your medicines at www.disposemymeds.org.             Medication List: This is a list of all your medications and when to take them. Check marks below indicate your daily home schedule. Keep this list as a reference.      Medications           Morning Afternoon Evening Bedtime As Needed    ADVIL PO   Take 400 mg by mouth daily as needed for moderate pain   Notes to Patient:  Resume per home regimen                                   albuterol 108 (90 BASE) MCG/ACT Inhaler   Commonly known as:  PROAIR HFA/PROVENTIL HFA/VENTOLIN HFA   Inhale 2 puffs into the lungs every 6 hours as needed for shortness of breath / dyspnea or wheezing   Notes to Patient:  Resume per home regimen                                    CELEXA PO   Take 30 mg by mouth every morning (Takes 1.5 x 20mg tablet = 30mg)   Last time this was given:  30 mg on 2/16/2017  8:01 AM   Next Dose Due:  2/17/17 am                                   enoxaparin 40 MG/0.4ML injection   Commonly known as:  LOVENOX   Inject 0.4 mLs (40 mg) Subcutaneous every 24 hours for 10 days   Last time this was given:  40 mg on 2/16/2017  8:31 AM   Next Dose Due:  2/17/17 am                                   ESTRACE PO   Take 1 mg by mouth At Bedtime   Notes to Patient:  Resume per home regimen                                   FISH OIL PO   Take 1 capsule by mouth every morning   Notes to Patient:  Resume per home regimen                                   HYDROmorphone 2 MG tablet   Commonly known as:  DILAUDID   Take 1-2 tablets (2-4 mg) by mouth every 3 hours as needed for moderate to severe pain   Last time this was given:  4 mg on 2/16/2017  8:31 AM   Next Dose Due:  2/16/17 2:30pm                                   loratadine-pseudoePHEDrine 5-120 MG per 12 hr tablet   Commonly known as:  CLARITIN-D 12-hour   Take 1 tablet by mouth 2 times daily as needed for allergies   Notes to Patient:  Resume per home regimen                                   PROBIOTIC PO   Take 1 capsule by mouth daily   Notes to Patient:  Resume per home regimen                                   senna-docusate 8.6-50 MG per tablet   Commonly known as:  SENOKOT-S;PERICOLACE   Take 1-2 tablets by mouth 2 times daily   Last time this was given:  2 tablets on 2/16/2017  8:01 AM   Next Dose Due:  2/16/17 pm                                      VITAMIN B 12 PO   Take 1,000 mcg by mouth daily   Notes to Patient:  Resume per home regimen                                   VITAMIN D (CHOLECALCIFEROL) PO   Take 1,000 Units by mouth daily   Notes to Patient:  Resume per home regimen                                   XANAX PO   Take 0.5 mg by mouth 3 times daily as needed for anxiety   Last time this  was given:  0.5 mg on 2/15/2017  7:52 PM   Notes to Patient:  Resume per home regimen

## 2017-02-13 NOTE — PROGRESS NOTES
Admission medication history interview status for the 2/13/2017  admission is complete. See EPIC admission navigator for prior to admission medications     Medication history source reliability:Good    Medication history interview source(s):Patient    Medication history resources (including written lists, pill bottles, clinic record):None    Primary pharmacy.[facility], [place] [address] [facility])    Additional medication history information not noted on PTA med list :None    Time spent in this activity: 30 minutes    Prior to Admission medications    Medication Sig Last Dose Taking? Auth Provider   Omega-3 Fatty Acids (FISH OIL PO) Take 1 capsule by mouth every morning 2/12/2017 at 0700 Yes Reported, Patient   Ibuprofen (ADVIL PO) Take 400 mg by mouth daily as needed for moderate pain 2/11/2017 at am Yes Reported, Patient   albuterol (PROAIR HFA/PROVENTIL HFA/VENTOLIN HFA) 108 (90 BASE) MCG/ACT Inhaler Inhale 2 puffs into the lungs every 6 hours as needed for shortness of breath / dyspnea or wheezing 2/13/2017 at 0600 Yes Reported, Patient   ALPRAZolam (XANAX PO) Take 0.5 mg by mouth 3 times daily as needed for anxiety 2/12/2017 at 2000 Yes Reported, Patient   VITAMIN D, CHOLECALCIFEROL, PO Take 1,000 Units by mouth daily 2/12/2017 at 0700 Yes Reported, Patient   Citalopram Hydrobromide (CELEXA PO) Take 30 mg by mouth every morning (Takes 1.5 x 20mg tablet = 30mg) 2/12/2017 at 0700 Yes Reported, Patient   Cyanocobalamin (VITAMIN B 12 PO) Take 1,000 mcg by mouth daily 2/12/2017 at 0700 Yes Reported, Patient   Estradiol (ESTRACE PO) Take 1 mg by mouth At Bedtime  2/12/2017 at 2000 Yes Reported, Patient   Probiotic Product (PROBIOTIC PO) Take 1 capsule by mouth daily  2/12/2017 at 0700 Yes Reported, Patient   loratadine-pseudoePHEDrine (CLARITIN-D 12-HOUR) 5-120 MG per 12 hr tablet Take 1 tablet by mouth 2 times daily as needed for allergies  2/12/2017 at 2000 Yes Reported, Patient

## 2017-02-13 NOTE — PROGRESS NOTES
02/13/17 1701   Quick Adds   Type of Visit Initial PT Evaluation   Living Environment   Lives With significant other  (Jonny)   Living Arrangements house   Number of Stairs to Enter Home 0   Number of Stairs Within Home 0   Transportation Available car;family or friend will provide  (Pt drove prior to hospitalization. )   Living Environment Comment Pt's fiance is able to assist at time of discharge.    Self-Care   Usual Activity Tolerance good   Current Activity Tolerance moderate   Regular Exercise yes   Activity/Exercise Type walking   Exercise Amount/Frequency daily   Equipment Currently Used at Home none   Activity/Exercise/Self-Care Comment Pt owns a cane and FWW. Pt works full time.    Functional Level Prior   Ambulation 0-->independent   Transferring 0-->independent   Fall history within last six months no   Prior Functional Level Comment Pt completed functional mobility independently without use of an AD at baseline.    General Information   Onset of Illness/Injury or Date of Surgery - Date 02/13/17   Referring Physician Gorge Williamson MD   Patient/Family Goals Statement None stated.    Pertinent History of Current Problem (include personal factors and/or comorbidities that impact the POC) 65 y/o female POD # 0 R TKA.    Precautions/Limitations fall precautions   Weight-Bearing Status - RLE weight-bearing as tolerated   General Observations Pt laying in bed upon arrival of therapist.    General Info Comments Ambulate with assist. KI on at night.    Cognitive Status Examination   Orientation orientation to person, place and time   Level of Consciousness alert   Follows Commands and Answers Questions 100% of the time   Personal Safety and Judgment intact   Pain Assessment   Patient Currently in Pain (R knee pain at rest: 6-7/10)   Integumentary/Edema   Integumentary/Edema Comments R knee incision covered with dressing. Hemovac intact.    Posture    Posture Comments No deficits noted.    Range of  "Motion (ROM)   ROM Comment R knee ROM: 10-78 degrees.    Strength   Strength Comments Not formally assessed, pt demonstrates at least 3/5 grossly in B LEs.    Bed Mobility   Bed Mobility Comments Supine <> sit, SBA.    Transfer Skills   Transfer Comments Sit <> stand with FWW and CGA.    Gait   Gait Comments Pt amb 10' with FWW and no KI donned, no R knee buckling noted.    Balance   Balance Comments Noted good sitting and standing balance at FWW.    Sensory Examination   Sensory Perception Comments Pt denied numbness/tingling in B LEs.    Modality Interventions   Planned Modality Interventions Cryotherapy   Planned Modality Interventions Comments PRN.    General Therapy Interventions   Planned Therapy Interventions bed mobility training;gait training;ROM;strengthening;transfer training   Clinical Impression   Criteria for Skilled Therapeutic Intervention yes, treatment indicated   PT Diagnosis Difficulty with gait.    Influenced by the following impairments Pain, Impaired R knee ROM, Decreased strength, and decreased activity tolerance.    Functional limitations due to impairments Limited functional mobility requiring AD and assist.    Clinical Presentation Stable/Uncomplicated   Clinical Presentation Rationale Based on PMH, current presentation, and social support.    Clinical Decision Making (Complexity) Low complexity   Therapy Frequency` 2 times/day   Predicted Duration of Therapy Intervention (days/wks) 4 days   Anticipated Discharge Disposition Home with Outpatient Therapy   Risk & Benefits of therapy have been explained Yes   Patient, Family & other staff in agreement with plan of care Yes   [facility] AM-PAC  \"6 Clicks\" V.2 Basic Mobility Inpatient Short Form   1. Turning from your back to your side while in a flat bed without using bedrails? 4 - None   2. Moving from lying on your back to sitting on the side of a flat bed without using bedrails? 3 - A Little   3. Moving to and from a bed to a chair " (including a wheelchair)? 3 - A Little   4. Standing up from a chair using your arms (e.g., wheelchair, or bedside chair)? 3 - A Little   5. To walk in hospital room? 3 - A Little   6. Climbing 3-5 steps with a railing? 2 - A Lot   Basic Mobility Raw Score (Score out of 24.Lower scores equate to lower levels of function) 18   Total Evaluation Time   Total Evaluation Time (Minutes) 8

## 2017-02-13 NOTE — ANESTHESIA CARE TRANSFER NOTE
Patient: Erika Hiens    Procedure(s):  LEFT TOTAL KNEE ARTHROPLASTY (SMITH & NEPHEW)^ - Wound Class: I-Clean    Diagnosis: djd   Diagnosis Additional Information: No value filed.    Anesthesia Type:   Spinal, Periph. Nerve Block for postop pain     Note:  Airway :Room Air  Patient transferred to:PACU        Vitals: (Last set prior to Anesthesia Care Transfer)              Electronically Signed By: VAZQUEZ Doss CRNA  February 13, 2017  9:28 AM

## 2017-02-14 ENCOUNTER — APPOINTMENT (OUTPATIENT)
Dept: PHYSICAL THERAPY | Facility: CLINIC | Age: 67
DRG: 470 | End: 2017-02-14
Attending: ORTHOPAEDIC SURGERY
Payer: MEDICARE

## 2017-02-14 LAB
HGB BLD-MCNC: 11.1 G/DL (ref 11.7–15.7)
INTERPRETATION ECG - MUSE: NORMAL

## 2017-02-14 PROCEDURE — 94660 CPAP INITIATION&MGMT: CPT

## 2017-02-14 PROCEDURE — A9270 NON-COVERED ITEM OR SERVICE: HCPCS | Mod: GY | Performed by: PHYSICIAN ASSISTANT

## 2017-02-14 PROCEDURE — 36415 COLL VENOUS BLD VENIPUNCTURE: CPT | Performed by: ORTHOPAEDIC SURGERY

## 2017-02-14 PROCEDURE — 25000128 H RX IP 250 OP 636: Performed by: ORTHOPAEDIC SURGERY

## 2017-02-14 PROCEDURE — 97110 THERAPEUTIC EXERCISES: CPT | Mod: GP | Performed by: PHYSICAL THERAPIST

## 2017-02-14 PROCEDURE — A9270 NON-COVERED ITEM OR SERVICE: HCPCS | Mod: GY | Performed by: ORTHOPAEDIC SURGERY

## 2017-02-14 PROCEDURE — 25000132 ZZH RX MED GY IP 250 OP 250 PS 637: Mod: GY | Performed by: PHYSICIAN ASSISTANT

## 2017-02-14 PROCEDURE — 12000007 ZZH R&B INTERMEDIATE

## 2017-02-14 PROCEDURE — 40000275 ZZH STATISTIC RCP TIME EA 10 MIN

## 2017-02-14 PROCEDURE — 40000193 ZZH STATISTIC PT WARD VISIT: Performed by: PHYSICAL THERAPIST

## 2017-02-14 PROCEDURE — 85018 HEMOGLOBIN: CPT | Performed by: ORTHOPAEDIC SURGERY

## 2017-02-14 PROCEDURE — 25000132 ZZH RX MED GY IP 250 OP 250 PS 637: Mod: GY | Performed by: ORTHOPAEDIC SURGERY

## 2017-02-14 PROCEDURE — 97116 GAIT TRAINING THERAPY: CPT | Mod: GP | Performed by: PHYSICAL THERAPIST

## 2017-02-14 RX ORDER — HYDROMORPHONE HYDROCHLORIDE 2 MG/1
2-4 TABLET ORAL
Status: DISCONTINUED | OUTPATIENT
Start: 2017-02-14 | End: 2017-02-16 | Stop reason: HOSPADM

## 2017-02-14 RX ADMIN — ENOXAPARIN SODIUM 40 MG: 40 INJECTION SUBCUTANEOUS at 09:14

## 2017-02-14 RX ADMIN — SENNOSIDES AND DOCUSATE SODIUM 2 TABLET: 8.6; 5 TABLET ORAL at 08:01

## 2017-02-14 RX ADMIN — CYCLOBENZAPRINE HYDROCHLORIDE 5 MG: 5 TABLET, FILM COATED ORAL at 09:15

## 2017-02-14 RX ADMIN — OXYCODONE HYDROCHLORIDE 10 MG: 5 TABLET ORAL at 08:02

## 2017-02-14 RX ADMIN — ACETAMINOPHEN 975 MG: 325 TABLET, FILM COATED ORAL at 14:11

## 2017-02-14 RX ADMIN — KETOROLAC TROMETHAMINE 15 MG: 15 INJECTION, SOLUTION INTRAMUSCULAR; INTRAVENOUS at 08:02

## 2017-02-14 RX ADMIN — HYDROMORPHONE HYDROCHLORIDE 4 MG: 2 TABLET ORAL at 21:07

## 2017-02-14 RX ADMIN — ACETAMINOPHEN 975 MG: 325 TABLET, FILM COATED ORAL at 05:30

## 2017-02-14 RX ADMIN — HYDROMORPHONE HYDROCHLORIDE 0.5 MG: 1 INJECTION, SOLUTION INTRAMUSCULAR; INTRAVENOUS; SUBCUTANEOUS at 13:42

## 2017-02-14 RX ADMIN — HYDROMORPHONE HYDROCHLORIDE 4 MG: 2 TABLET ORAL at 17:39

## 2017-02-14 RX ADMIN — ALPRAZOLAM 0.5 MG: 0.5 TABLET ORAL at 21:09

## 2017-02-14 RX ADMIN — CITALOPRAM HYDROBROMIDE 30 MG: 20 TABLET ORAL at 08:02

## 2017-02-14 RX ADMIN — KETOROLAC TROMETHAMINE 15 MG: 15 INJECTION, SOLUTION INTRAMUSCULAR; INTRAVENOUS at 02:04

## 2017-02-14 RX ADMIN — HYDROMORPHONE HYDROCHLORIDE 4 MG: 2 TABLET ORAL at 14:11

## 2017-02-14 RX ADMIN — HYDROMORPHONE HYDROCHLORIDE 4 MG: 2 TABLET ORAL at 11:18

## 2017-02-14 RX ADMIN — SENNOSIDES AND DOCUSATE SODIUM 2 TABLET: 8.6; 5 TABLET ORAL at 21:07

## 2017-02-14 RX ADMIN — CEFAZOLIN SODIUM 1 G: 1 INJECTION, POWDER, FOR SOLUTION INTRAMUSCULAR; INTRAVENOUS at 02:04

## 2017-02-14 RX ADMIN — ACETAMINOPHEN 975 MG: 325 TABLET, FILM COATED ORAL at 21:07

## 2017-02-14 RX ADMIN — HYDROMORPHONE HYDROCHLORIDE 0.5 MG: 1 INJECTION, SOLUTION INTRAMUSCULAR; INTRAVENOUS; SUBCUTANEOUS at 05:33

## 2017-02-14 RX ADMIN — OXYCODONE HYDROCHLORIDE 10 MG: 5 TABLET ORAL at 03:53

## 2017-02-14 NOTE — PLAN OF CARE
Problem: Goal Outcome Summary  Goal: Goal Outcome Summary  Outcome: Improving  Alert and oriented. Up with one and walker. Unable to void. Straight cathed @ 2215 for 550cc. Taking oxycodone and IV dilaudid for pain control. CMS intact. Dressing CDI. Progressing per plan of care.

## 2017-02-14 NOTE — PROGRESS NOTES
[facility] Orthopedic Post-Op / Progress Note  Erika Hines is a 66 year old female    Today's Date:2017  Admission Date: 2017  POD # 1 TKA         Interval History:   doing well  Pain management requires switching to dilaudid            Physical Exam:   All vitals have been reviewed  Temperatures:  Current - Temp: 98  F (36.7  C); Max - Temp  Av.2  F (36.8  C)  Min: 97.9  F (36.6  C)  Max: 98.9  F (37.2  C)  Pulse range: Pulse  Av.5  Min: 58  Max: 65  Blood pressure range: Systolic (24hrs), Av , Min:95 , Max:127   ; Diastolic (24hrs), Av, Min:51, Max:69      Intake/Output Summary (Last 24 hours) at 17 1234  Last data filed at 17 0800   Gross per 24 hour   Intake             1120 ml   Output              675 ml   Net              445 ml       Wound clean and dry with minimal or no drainage.  Surrounding skin with minimal erythema.          Data:   All laboratory data related to this surgery reviewed      Lab Results   Component Value Date     2017    POTASSIUM 4.2 2017    CHLORIDE 110 (H) 2017    CO2 26 2017     (H) 2017     Lab Results   Component Value Date    HGB 11.1 (L) 2017    HGB 13.0 2017     Platelet Count (10e9/L)   Date Value   2017 253       All imaging studies related to this surgery reviewed         Assessment and Plan:    Assessment:  Doing well.  No immediate surgical complications identified.  No excessive bleeding  Tolerating physical therapy and rehabilitation well.    Plan:  Continue physical therapy  Pain control measures  Discharge plan: Home Thursday    Gorge Williamson MD

## 2017-02-14 NOTE — PLAN OF CARE
Problem: Goal Outcome Summary  Goal: Goal Outcome Summary  Outcome: Improving  A&O. Up w/ 1 and walker. CMS intact, dressing cdi. Oxycodone for pain. HVAC. IV SL. Continue to monitor.

## 2017-02-14 NOTE — PLAN OF CARE
Problem: Goal Outcome Summary  Goal: Goal Outcome Summary     PT: Pt demonstrating good tolerance and participation with therapy.      Surgeon Discharge Plan:  DC to home Thursday per most recent note       Current Functional Status: Patient performed supine <> sit, standby assist. Sit <> stand with walker and contact guard assist. Patient ambulated 100 ft w/ WW and R WBAT, CGA throughout. Pt navigated 3 steps w/ dual HR and min A. R knee ROM: 15-75 degrees.       Barriers to Plan/Home: None indicated at this time.

## 2017-02-15 ENCOUNTER — APPOINTMENT (OUTPATIENT)
Dept: PHYSICAL THERAPY | Facility: CLINIC | Age: 67
DRG: 470 | End: 2017-02-15
Attending: ORTHOPAEDIC SURGERY
Payer: MEDICARE

## 2017-02-15 ENCOUNTER — APPOINTMENT (OUTPATIENT)
Dept: OCCUPATIONAL THERAPY | Facility: CLINIC | Age: 67
DRG: 470 | End: 2017-02-15
Attending: ORTHOPAEDIC SURGERY
Payer: MEDICARE

## 2017-02-15 LAB — HGB BLD-MCNC: 11.6 G/DL (ref 11.7–15.7)

## 2017-02-15 PROCEDURE — A9270 NON-COVERED ITEM OR SERVICE: HCPCS | Mod: GY | Performed by: PHYSICIAN ASSISTANT

## 2017-02-15 PROCEDURE — A9270 NON-COVERED ITEM OR SERVICE: HCPCS | Mod: GY | Performed by: ORTHOPAEDIC SURGERY

## 2017-02-15 PROCEDURE — 40000193 ZZH STATISTIC PT WARD VISIT: Performed by: PHYSICAL THERAPIST

## 2017-02-15 PROCEDURE — 36415 COLL VENOUS BLD VENIPUNCTURE: CPT | Performed by: ORTHOPAEDIC SURGERY

## 2017-02-15 PROCEDURE — 97110 THERAPEUTIC EXERCISES: CPT | Mod: GP

## 2017-02-15 PROCEDURE — 40000193 ZZH STATISTIC PT WARD VISIT

## 2017-02-15 PROCEDURE — 25000132 ZZH RX MED GY IP 250 OP 250 PS 637: Mod: GY | Performed by: ORTHOPAEDIC SURGERY

## 2017-02-15 PROCEDURE — 25000128 H RX IP 250 OP 636: Performed by: ORTHOPAEDIC SURGERY

## 2017-02-15 PROCEDURE — 85018 HEMOGLOBIN: CPT | Performed by: ORTHOPAEDIC SURGERY

## 2017-02-15 PROCEDURE — 25000132 ZZH RX MED GY IP 250 OP 250 PS 637: Mod: GY | Performed by: PHYSICIAN ASSISTANT

## 2017-02-15 PROCEDURE — 97116 GAIT TRAINING THERAPY: CPT | Mod: GP

## 2017-02-15 PROCEDURE — 97110 THERAPEUTIC EXERCISES: CPT | Mod: GP | Performed by: PHYSICAL THERAPIST

## 2017-02-15 PROCEDURE — 40000133 ZZH STATISTIC OT WARD VISIT: Performed by: OCCUPATIONAL THERAPIST

## 2017-02-15 PROCEDURE — 97535 SELF CARE MNGMENT TRAINING: CPT | Mod: GO | Performed by: OCCUPATIONAL THERAPIST

## 2017-02-15 PROCEDURE — 97116 GAIT TRAINING THERAPY: CPT | Mod: GP | Performed by: PHYSICAL THERAPIST

## 2017-02-15 PROCEDURE — 12000007 ZZH R&B INTERMEDIATE

## 2017-02-15 PROCEDURE — 97165 OT EVAL LOW COMPLEX 30 MIN: CPT | Mod: GO | Performed by: OCCUPATIONAL THERAPIST

## 2017-02-15 RX ORDER — AMOXICILLIN 250 MG
1-2 CAPSULE ORAL 2 TIMES DAILY
Qty: 50 TABLET | Refills: 0 | Status: SHIPPED | OUTPATIENT
Start: 2017-02-15 | End: 2021-12-17

## 2017-02-15 RX ORDER — HYDROMORPHONE HYDROCHLORIDE 2 MG/1
2-4 TABLET ORAL
Qty: 40 TABLET | Refills: 0 | Status: SHIPPED | OUTPATIENT
Start: 2017-02-15 | End: 2021-12-17

## 2017-02-15 RX ADMIN — ENOXAPARIN SODIUM 40 MG: 40 INJECTION SUBCUTANEOUS at 08:31

## 2017-02-15 RX ADMIN — HYDROMORPHONE HYDROCHLORIDE 4 MG: 2 TABLET ORAL at 03:37

## 2017-02-15 RX ADMIN — HYDROMORPHONE HYDROCHLORIDE 4 MG: 2 TABLET ORAL at 19:51

## 2017-02-15 RX ADMIN — CITALOPRAM HYDROBROMIDE 30 MG: 20 TABLET ORAL at 08:31

## 2017-02-15 RX ADMIN — HYDROMORPHONE HYDROCHLORIDE 4 MG: 2 TABLET ORAL at 00:18

## 2017-02-15 RX ADMIN — HYDROMORPHONE HYDROCHLORIDE 4 MG: 2 TABLET ORAL at 17:04

## 2017-02-15 RX ADMIN — HYDROMORPHONE HYDROCHLORIDE 4 MG: 2 TABLET ORAL at 13:10

## 2017-02-15 RX ADMIN — HYDROMORPHONE HYDROCHLORIDE 4 MG: 2 TABLET ORAL at 10:00

## 2017-02-15 RX ADMIN — SENNOSIDES AND DOCUSATE SODIUM 2 TABLET: 8.6; 5 TABLET ORAL at 19:52

## 2017-02-15 RX ADMIN — HYDROMORPHONE HYDROCHLORIDE 4 MG: 2 TABLET ORAL at 07:01

## 2017-02-15 RX ADMIN — ALPRAZOLAM 0.5 MG: 0.5 TABLET ORAL at 19:52

## 2017-02-15 RX ADMIN — ACETAMINOPHEN 975 MG: 325 TABLET, FILM COATED ORAL at 06:05

## 2017-02-15 RX ADMIN — ACETAMINOPHEN 975 MG: 325 TABLET, FILM COATED ORAL at 13:14

## 2017-02-15 RX ADMIN — SENNOSIDES AND DOCUSATE SODIUM 2 TABLET: 8.6; 5 TABLET ORAL at 08:31

## 2017-02-15 NOTE — PLAN OF CARE
Problem: Goal Outcome Summary  Goal: Goal Outcome Summary     PT: Pt progressing well with therapy, with exception of limited progress with knee extexion R AAROM 15-80  Patient performed supine <> sit, standby assist. Sit <> stand with walker and SBA Patient ambulated 150 ft w/ WW and R WBAT,SBA.     Surgeon Discharge Plan: DC to home OPPT      Current Functional Status: see above      Barriers to Plan/Home: None indicated at this time.

## 2017-02-15 NOTE — PLAN OF CARE
Problem: Goal Outcome Summary  Goal: Goal Outcome Summary  Outcome: Improving  Pt A/O, VSS on RA> Up with 1 to bathroom. CMS intact. Pain managed with PO dilaudid and flexeril. Dressing changed and hemovac d/c'd Pt progressing per plan of care.

## 2017-02-15 NOTE — PROGRESS NOTES
[facility] Post-Op / Progress Note  Erika Hines is a 66 year old female    Today's Date:2/15/2017  Admission Date: 2017  POD # 2 TKA         Interval History:   doing well  Much better today.    Mobilizing well.            Physical Exam:   All vitals have been reviewed  Temperatures:  Current - Temp: 97.9  F (36.6  C); Max - Temp  Av.8  F (37.1  C)  Min: 97.9  F (36.6  C)  Max: 99.4  F (37.4  C)  Pulse range: No Data Recorded  Blood pressure range: Systolic (24hrs), Av , Min:112 , Max:123   ; Diastolic (24hrs), Av, Min:58, Max:64      Intake/Output Summary (Last 24 hours) at 02/15/17 1517  Last data filed at 02/15/17 0930   Gross per 24 hour   Intake             1210 ml   Output                0 ml   Net             1210 ml       Wound clean and dry with minimal or no drainage.  Surrounding skin with minimal erythema.          Data:   All laboratory data related to this surgery reviewed      Lab Results   Component Value Date     2017    POTASSIUM 4.2 2017    CHLORIDE 110 (H) 2017    CO2 26 2017     (H) 2017     Lab Results   Component Value Date    HGB 11.6 (L) 02/15/2017    HGB 11.1 (L) 2017    HGB 13.0 2017     Platelet Count (10e9/L)   Date Value   2017 253       All imaging studies related to this surgery reviewed         Assessment and Plan:    Assessment:  Doing well.  No immediate surgical complications identified.  No excessive bleeding  Pain well-controlled.  Tolerating physical therapy and rehabilitation well.    Plan:  Continue physical therapy  Pain control measures  Discharge plan: Home tomorrow    Gorge Williamson MD

## 2017-02-15 NOTE — DISCHARGE INSTRUCTIONS
DISCHARGE INSTRUCTIONS AFTER YOUR TOTAL KNEE REPLACEMENT     SHON GOTTI MD       Instructions to care for your wound at home:   Change the dressing daily.  Inspect your incision at the time of dressing change for increased redness, tenderness, swelling, or drainage along the incision line.  Some bruising or discoloration is usually present, but call my office for any changes in appearance that concern you.  Also call if you develop a fever above 101 degrees.     You may shower directly over the wound beginning 4 days after your surgery, but do not submerge the wound under water until after your post operative visit when the sutures are removed and the wound is completely sealed without drainage.    Activities:  Physical activity may be resumed gradually according to your comfort level. You may bear weight on your operative leg as tolerated with your crutches or walker as instructed by your therapist.  Follow your home exercise program.  Ice your knee after exercising.        Wear your knee immobilizer at night only until your office visit in 2 weeks to maintain full knee extension.  Do not use the immobilizer during the day unless otherwise instructed.      Wear the anti-embolism stockings day and night until seen in the office for your post operative visit. Remove them twice daily for one hour at a time. Keep the compression stockings flat on your leg.  Do not allow them to roll up or crease your skin.  Call if you develop calf pain.     Outpatient Physical Therapy and home exercises:  Outpatient physical therapy visits are required following discharge from the hospital. The referral for these outpatient therapy visits is routinely given to you at the time of your surgical scheduling. You should have already scheduled your therapy sessions in advance.  If you have not done so, please immediately call the therapy site of your choice to schedule the physical therapy regimen that has been prescribed for you.  You  may discontinue the crutches or walker per the therapist's recommendation.      Medications:  New medications for you on discharge will include a pain medication, a stool softener while on the narcotic pain medication, and a blood thinner.  Detailed instructions will come with those medications.  You will also receive instructions on when to resume your home medications.     If you routinely take Aspirin 81 mg, hold the Aspirin while taking the formal blood thinner medication. Then take Aspirin 325 mg (1 tablet) daily for 4 weeks.  Then resume your Aspirin 81 mg daily if that is your routine.        Antibiotic coverage will be needed before any type of dental procedure.  This is a life long recommendation.  You should notify your dentist of your total knee surgery and call your dentist or our office one week before a dental appointment for antibiotics.        Clinic follow-up appointment:  Your clinic follow-up appointment has been prearranged.  Call [phone] with any questions.    Gorge Williamson MD

## 2017-02-15 NOTE — PLAN OF CARE
Problem: Goal Outcome Summary  Goal: Goal Outcome Summary  Patient is A&O, VSS, CMS intact, regular diet, up with assist of 1, voiding adequately. PO dilaudid for pain control, dry drainage on dressing, wears CPAP at night. Already awake sipping coffee. She is progressing per plan of care

## 2017-02-15 NOTE — PLAN OF CARE
Problem: Goal Outcome Summary  Goal: Goal Outcome Summary  Outcome: Improving  Pt remains A/O, up with SBA walker and GB. Pt pain controlled with dilaudid. Dressing changed. IV SL.

## 2017-02-15 NOTE — DISCHARGE SUMMARY
DISCHARGE SUMMARY    NAME:  Erika Hines  AGE:  66 year old  YOB: 1950  MRN#:  [id number]    Erika Hines was admitted for elective total knee arthroplasty.  The surgery was performed on 2/13/2017.  The postoperative course is documented in the medical record.  There were no complications. The patient was felt ready for discharge home on POD #3 with post-discharge physical therapy and outpatient visit prearranged.     Lab Results   Component Value Date    HGB 11.6 (L) 02/15/2017    HGB 11.1 (L) 02/14/2017    HGB 13.0 02/13/2017       The patient received 0 units transfusion.      FINAL DISCHARGE DIAGNOSIS:  Degenerative osteoarthritis knee.               Acute blood loss anemia     SURGICAL PROCEDURE THIS ADMISSION:   total knee arthroplasty.         SHON GOTTI MD      CC: Fax [phone]         Shon Gotti MD

## 2017-02-16 ENCOUNTER — APPOINTMENT (OUTPATIENT)
Dept: PHYSICAL THERAPY | Facility: CLINIC | Age: 67
DRG: 470 | End: 2017-02-16
Attending: ORTHOPAEDIC SURGERY
Payer: MEDICARE

## 2017-02-16 VITALS
DIASTOLIC BLOOD PRESSURE: 67 MMHG | SYSTOLIC BLOOD PRESSURE: 144 MMHG | HEIGHT: 66 IN | HEART RATE: 53 BPM | OXYGEN SATURATION: 95 % | BODY MASS INDEX: 24.91 KG/M2 | WEIGHT: 155 LBS | RESPIRATION RATE: 16 BRPM | TEMPERATURE: 97.7 F

## 2017-02-16 LAB
CREAT SERPL-MCNC: 0.68 MG/DL (ref 0.52–1.04)
GFR SERPL CREATININE-BSD FRML MDRD: 87 ML/MIN/1.7M2
PLATELET # BLD AUTO: 221 10E9/L (ref 150–450)

## 2017-02-16 PROCEDURE — 97116 GAIT TRAINING THERAPY: CPT | Mod: GP

## 2017-02-16 PROCEDURE — 25000132 ZZH RX MED GY IP 250 OP 250 PS 637: Mod: GY | Performed by: PHYSICIAN ASSISTANT

## 2017-02-16 PROCEDURE — 85049 AUTOMATED PLATELET COUNT: CPT | Performed by: ORTHOPAEDIC SURGERY

## 2017-02-16 PROCEDURE — 36415 COLL VENOUS BLD VENIPUNCTURE: CPT | Performed by: ORTHOPAEDIC SURGERY

## 2017-02-16 PROCEDURE — 97530 THERAPEUTIC ACTIVITIES: CPT | Mod: GP

## 2017-02-16 PROCEDURE — 97110 THERAPEUTIC EXERCISES: CPT | Mod: GP

## 2017-02-16 PROCEDURE — 25000128 H RX IP 250 OP 636: Performed by: ORTHOPAEDIC SURGERY

## 2017-02-16 PROCEDURE — A9270 NON-COVERED ITEM OR SERVICE: HCPCS | Mod: GY | Performed by: ORTHOPAEDIC SURGERY

## 2017-02-16 PROCEDURE — 25000132 ZZH RX MED GY IP 250 OP 250 PS 637: Mod: GY | Performed by: ORTHOPAEDIC SURGERY

## 2017-02-16 PROCEDURE — 40000193 ZZH STATISTIC PT WARD VISIT

## 2017-02-16 PROCEDURE — A9270 NON-COVERED ITEM OR SERVICE: HCPCS | Mod: GY | Performed by: PHYSICIAN ASSISTANT

## 2017-02-16 PROCEDURE — 82565 ASSAY OF CREATININE: CPT | Performed by: ORTHOPAEDIC SURGERY

## 2017-02-16 RX ADMIN — HYDROMORPHONE HYDROCHLORIDE 4 MG: 2 TABLET ORAL at 01:45

## 2017-02-16 RX ADMIN — SENNOSIDES AND DOCUSATE SODIUM 2 TABLET: 8.6; 5 TABLET ORAL at 08:01

## 2017-02-16 RX ADMIN — HYDROMORPHONE HYDROCHLORIDE 4 MG: 2 TABLET ORAL at 05:24

## 2017-02-16 RX ADMIN — ACETAMINOPHEN 975 MG: 325 TABLET, FILM COATED ORAL at 05:24

## 2017-02-16 RX ADMIN — CITALOPRAM HYDROBROMIDE 30 MG: 20 TABLET ORAL at 08:01

## 2017-02-16 RX ADMIN — HYDROMORPHONE HYDROCHLORIDE 4 MG: 2 TABLET ORAL at 08:31

## 2017-02-16 RX ADMIN — ENOXAPARIN SODIUM 40 MG: 40 INJECTION SUBCUTANEOUS at 08:31

## 2017-02-16 RX ADMIN — HYDROMORPHONE HYDROCHLORIDE 4 MG: 2 TABLET ORAL at 11:28

## 2017-02-16 NOTE — PLAN OF CARE
Problem: Goal Outcome Summary  Goal: Goal Outcome Summary  Patient is A&O, VSS on RA, CMS intact, regular diet, voiding adequately, independent. PO dilaudid for pain control, CPAP at night. She is progressing per plan of care.

## 2017-02-16 NOTE — PLAN OF CARE
Problem: Goal Outcome Summary  Goal: Goal Outcome Summary  Surgeon Discharge Plan: Home [place]     Current Functional Status:Evaluation/treatment completed. Pt. resides in a house w/her significant other/finace, who will be able to assist at discharge. Pt. typically indep. W/ ADL's/IADL's, works full-time. Pt. reports comfort height toilets, walk-in shower w/grab bars. Pt. Doing very well--indep. W/ sit-stand from WC;pt. Demo. Room mobility w/supervision only, reports minimal pain. Instructed in safe bathroom transfers--completed toilet transfer w supervision only;completed simulated shower transfer w/ SBA--rec. Fiance supervise initial bathroom transfers. Pt. completed LE dressing at indep. Level without AE--able to doff/kiran socks/Jaden hose, kiran/doff pants;pt. Has a LHSH to use at home. Ed. In home walker safety techs., EC/WS techs.--verbalized understanding. Pt. Has met all goals--no further skilled OT intervention needed/rec. at this time--will complete order.     Barriers to Plan/Home:None        Occupational Therapy Discharge Summary    Reason for therapy discharge:    All goals and outcomes met, no further needs identified.    Progress towards therapy goal(s). See goals on Care Plan in [facility] electronic health record for goal details.  Goals met    Therapy recommendation(s):    No further therapy is recommended.

## 2017-02-16 NOTE — PROGRESS NOTES
02/15/17 1500   Quick Adds   Type of Visit Initial Occupational Therapy Evaluation   Living Environment   Lives With significant other  (randa--Jonny)   Living Arrangements house   Number of Stairs to Enter Home 0   Number of Stairs Within Home 0   Transportation Available car;family or friend will provide   Living Environment Comment Pt. reprts comfort height toilets w/vanity support, walk-in shower w 2 grab bars;also has [facility].    Self-Care   Usual Activity Tolerance good   Current Activity Tolerance moderate   Regular Exercise yes   Activity/Exercise Type strength training;walking  (worked out w/ 3 months prior to sx.)   Exercise Amount/Frequency daily   Activity/Exercise/Self-Care Comment Pt. previously indep. w/ ADL's/IADL's, works full-time;finace able to assist at discharge.  (has )   Functional Level Prior   Ambulation 0-->independent   Transferring 0-->independent   Toileting 0-->independent   Bathing 0-->independent   Dressing 0-->independent   Eating 0-->independent   Communication 0-->understands/communicates without difficulty   Swallowing 0-->swallows foods/liquids without difficulty   Cognition 0 - no cognition issues reported   Fall history within last six months no   Which of the above functional risks had a recent onset or change? ambulation;transferring;toileting;bathing;dressing   General Information   Onset of Illness/Injury or Date of Surgery - Date 02/13/17   Referring Physician Dr. Williamson   Patient/Family Goals Statement Plans to DC home, fiance assist   Additional Occupational Profile Info/Pertinent History of Current Problem Pt. underwent a R TKA   Precautions/Limitations fall precautions   Weight-Bearing Status - RLE weight-bearing as tolerated   General Observations Pt. up in chair, agreeable to OT   General Info Comments Pt. has a desk job, off of work for recovery   Cognitive Status Examination   Cognitive Comment intact per observation/conversation    Visual Perception   Visual Perception Comments wears glasses--no vision problems noted/reported   Sensory Examination   Sensory Comments No numbness/tingling reported   Pain Assessment   Patient Currently in Pain Yes, see Vital Sign flowsheet  (minimal reported--R knee)   Range of Motion (ROM)   ROM Comment WNL   Strength   Strength Comments WNL--good UE strength   Hand Strength   Hand Strength Comments WNL   Mobility   Bed Mobility Comments indep. sit-supine   Transfer Skill: Sit to Stand   Level of [place]: Sit/Stand independent   Physical Assist/Nonphysical Assist: Sit/Stand 1 person assist   Transfer Skill: Sit to Stand weight-bearing as tolerated   Assistive Device for Transfer: Sit/Stand rolling walker   Transfer Skill: Toilet Transfer   Toilet Transfer Skill Comments comfort height toilets at home,vanity support   Tub/Shower Transfer   Tub/Shower Transfer Comments walk-in shower, grab bars   Balance   Balance Comments good balance for room mobility w/ WW, no LOB   Lower Body Dressing   Level of [place]: Dress Lower Body independent   Instrumental Activities of Daily Living (IADL)   Previous Responsibilities meal prep;laundry;work  (significant other to assist w/IADL's)   Activities of Daily Living Analysis   Impairments Contributing to Impaired Activities of Daily Living balance impaired;flexibility decreased;pain;ROM decreased;strength decreased  (decreased LE ROM/strength)   General Therapy Interventions   Planned Therapy Interventions ADL retraining   Clinical Impression   Criteria for Skilled Therapeutic Interventions Met yes, treatment indicated   OT Diagnosis Decline in ADL performance   Influenced by the following impairments impaired balance, pain, decreased flexibility, decreased RLE ROM/strength   Assessment of Occupational Performance 1-3 Performance Deficits   Identified Performance Deficits weakness, impaired balance limiting ADL performance(dressing, grooming)   Clinical Decision  "Making (Complexity) Low complexity   Therapy Frequency (1 session only)   Predicted Duration of Therapy Intervention (days/wks) (1 session only)   Anticipated Discharge Disposition Home;Home with Assist   Risks and Benefits of Treatment have been explained. Yes   Patient, Family & other staff in agreement with plan of care Yes   [facility] TM \"6 Clicks\"   2016, Trustees of [facility], under license to [employer].  All rights reserved.   6 Clicks Short Forms Daily Activity Inpatient Short Form   [facility]  \"6 Clicks\" Daily Activity Inpatient Short Form   1. Putting on and taking off regular lower body clothing? 3 - A Little   2. Bathing (including washing, rinsing, drying)? 3 - A Little   3. Toileting, which includes using toilet, bedpan or urinal? 3 - A Little   4. Putting on and taking off regular upper body clothing? 4 - None   5. Taking care of personal grooming such as brushing teeth? 4 - None   6. Eating meals? 4 - None   Daily Activity Raw Score (Score out of 24.Lower scores equate to lower levels of function) 21   Total Evaluation Time   Total Evaluation Time (Minutes) 9     "

## 2017-02-16 NOTE — PLAN OF CARE
Problem: Goal Outcome Summary  Goal: Goal Outcome Summary  Outcome: Therapy, progress toward functional goals as expected  Surgeon Discharge Plan: DC to home with OPPT; PT in agreement.      Current Functional Status: Sit to/from stand with FWW and SBA. Sit to/from supine with SBA. Amb 200 ft x 1 with FWW and SBA. Tolerates TKA exs moderately due to increased pain. Pt returned supine at end of session. R knee AAROM 12-81 degrees.      Barriers to Plan/Home: None indicated at this time.           Pt discharged home today with assist and scheduled OPPT.    PT goals not met.

## 2017-02-16 NOTE — PLAN OF CARE
Problem: Goal Outcome Summary  Goal: Goal Outcome Summary  Outcome: Adequate for Discharge Date Met:  02/16/17  Patient remains A/O, IND with transfers and ambulation. Pain controlled with dilaudid. Discharged today with significant other. 2/16/17 at 1130. Did overview of medications and paperwork, no further questions. All belongings sent with pt.

## 2020-01-09 ENCOUNTER — APPOINTMENT (OUTPATIENT)
Dept: GENERAL RADIOLOGY | Facility: CLINIC | Age: 70
End: 2020-01-09
Attending: PHYSICIAN ASSISTANT
Payer: MEDICARE

## 2020-01-09 ENCOUNTER — HOSPITAL ENCOUNTER (EMERGENCY)
Facility: CLINIC | Age: 70
Discharge: HOME | End: 2020-01-09
Attending: PHYSICIAN ASSISTANT | Admitting: PHYSICIAN ASSISTANT
Payer: MEDICARE

## 2020-01-09 ENCOUNTER — APPOINTMENT (OUTPATIENT)
Dept: CARDIOLOGY | Facility: CLINIC | Age: 70
End: 2020-01-09
Attending: PHYSICIAN ASSISTANT
Payer: MEDICARE

## 2020-01-09 VITALS
HEART RATE: 64 BPM | RESPIRATION RATE: 11 BRPM | TEMPERATURE: 98.7 F | SYSTOLIC BLOOD PRESSURE: 155 MMHG | OXYGEN SATURATION: 98 % | DIASTOLIC BLOOD PRESSURE: 88 MMHG | HEIGHT: 65 IN | BODY MASS INDEX: 24.83 KG/M2 | WEIGHT: 149 LBS

## 2020-01-09 DIAGNOSIS — R07.9 ACUTE CHEST PAIN: ICD-10-CM

## 2020-01-09 DIAGNOSIS — I49.8 BIGEMINY: ICD-10-CM

## 2020-01-09 LAB
ANION GAP SERPL CALCULATED.3IONS-SCNC: 6 MMOL/L (ref 3–14)
BASOPHILS # BLD AUTO: 0 10E9/L (ref 0–0.2)
BASOPHILS NFR BLD AUTO: 0.4 %
BUN SERPL-MCNC: 11 MG/DL (ref 7–30)
CALCIUM SERPL-MCNC: 9.4 MG/DL (ref 8.5–10.1)
CHLORIDE SERPL-SCNC: 107 MMOL/L (ref 94–109)
CO2 SERPL-SCNC: 24 MMOL/L (ref 20–32)
CREAT SERPL-MCNC: 0.81 MG/DL (ref 0.52–1.04)
D DIMER PPP FEU-MCNC: <0.3 UG/ML FEU (ref 0–0.5)
DIFFERENTIAL METHOD BLD: NORMAL
EOSINOPHIL # BLD AUTO: 0.1 10E9/L (ref 0–0.7)
EOSINOPHIL NFR BLD AUTO: 1.6 %
ERYTHROCYTE [DISTWIDTH] IN BLOOD BY AUTOMATED COUNT: 14 % (ref 10–15)
GFR SERPL CREATININE-BSD FRML MDRD: 75 ML/MIN/{1.73_M2}
GLUCOSE SERPL-MCNC: 102 MG/DL (ref 70–99)
HCT VFR BLD AUTO: 40.9 % (ref 35–47)
HGB BLD-MCNC: 14.1 G/DL (ref 11.7–15.7)
IMM GRANULOCYTES # BLD: 0 10E9/L (ref 0–0.4)
IMM GRANULOCYTES NFR BLD: 0.2 %
INTERPRETATION ECG - MUSE: NORMAL
LYMPHOCYTES # BLD AUTO: 2 10E9/L (ref 0.8–5.3)
LYMPHOCYTES NFR BLD AUTO: 40.5 %
MCH RBC QN AUTO: 32 PG (ref 26.5–33)
MCHC RBC AUTO-ENTMCNC: 34.5 G/DL (ref 31.5–36.5)
MCV RBC AUTO: 93 FL (ref 78–100)
MONOCYTES # BLD AUTO: 0.3 10E9/L (ref 0–1.3)
MONOCYTES NFR BLD AUTO: 6.7 %
NEUTROPHILS # BLD AUTO: 2.5 10E9/L (ref 1.6–8.3)
NEUTROPHILS NFR BLD AUTO: 50.6 %
NRBC # BLD AUTO: 0 10*3/UL
NRBC BLD AUTO-RTO: 0 /100
PLATELET # BLD AUTO: 264 10E9/L (ref 150–450)
POTASSIUM SERPL-SCNC: 4.3 MMOL/L (ref 3.4–5.3)
RBC # BLD AUTO: 4.41 10E12/L (ref 3.8–5.2)
SODIUM SERPL-SCNC: 137 MMOL/L (ref 133–144)
TROPONIN I SERPL-MCNC: <0.015 UG/L (ref 0–0.04)
TROPONIN I SERPL-MCNC: <0.015 UG/L (ref 0–0.04)
WBC # BLD AUTO: 4.9 10E9/L (ref 4–11)

## 2020-01-09 PROCEDURE — 0296T ZIO PATCH HOLTER ADULT PEDIATRIC GREATER THAN 48 HRS: CPT

## 2020-01-09 PROCEDURE — 80048 BASIC METABOLIC PNL TOTAL CA: CPT | Performed by: EMERGENCY MEDICINE

## 2020-01-09 PROCEDURE — 99285 EMERGENCY DEPT VISIT HI MDM: CPT | Mod: 25

## 2020-01-09 PROCEDURE — 93005 ELECTROCARDIOGRAM TRACING: CPT | Mod: XU

## 2020-01-09 PROCEDURE — 84484 ASSAY OF TROPONIN QUANT: CPT | Performed by: EMERGENCY MEDICINE

## 2020-01-09 PROCEDURE — 85379 FIBRIN DEGRADATION QUANT: CPT | Performed by: PHYSICIAN ASSISTANT

## 2020-01-09 PROCEDURE — 0298T ZIO PATCH HOLTER ADULT PEDIATRIC GREATER THAN 48 HRS: CPT | Performed by: INTERNAL MEDICINE

## 2020-01-09 PROCEDURE — 71046 X-RAY EXAM CHEST 2 VIEWS: CPT

## 2020-01-09 PROCEDURE — 85025 COMPLETE CBC W/AUTO DIFF WBC: CPT | Performed by: EMERGENCY MEDICINE

## 2020-01-09 PROCEDURE — 84484 ASSAY OF TROPONIN QUANT: CPT | Performed by: PHYSICIAN ASSISTANT

## 2020-01-09 ASSESSMENT — ENCOUNTER SYMPTOMS
LIGHT-HEADEDNESS: 1
FEVER: 0
NAUSEA: 1
BACK PAIN: 1
SHORTNESS OF BREATH: 1
CHILLS: 0

## 2020-01-09 ASSESSMENT — MIFFLIN-ST. JEOR: SCORE: 1206.74

## 2020-01-09 NOTE — DISCHARGE INSTRUCTIONS
Take a baby aspirin and no excessive exertion.   See primary care for follow up - she can place order for ECHO if chest pain persists.     Discharge Instructions  Chest Pain    You have been seen today for chest pain or discomfort.  At this time, your provider has found no signs that your chest pain is due to a serious or life-threatening condition, (or you have declined more testing and/or admission to the hospital). However, sometimes there is a serious problem that does not show up right away. Your evaluation today may not be complete and you may need further testing and evaluation.     Generally, every Emergency Department visit should have a follow-up clinic visit with either a primary or a specialty clinic/provider. Please follow-up as instructed by your emergency provider today.  Return to the Emergency Department if:  Your chest pain changes, gets worse, starts to happen more often, or comes with less activity.  You are newly short of breath.  You get very weak or tired.  You pass out or faint.  You have any new symptoms, like fever, cough, numb legs, or you cough up blood.  You have anything else that worries you.    Until you follow-up with your regular provider, please do the following:  Take one aspirin daily unless you have an allergy or are told not to by your provider.  If a stress test appointment has been made, go to the appointment.  If you have questions, contact your regular provider.  Follow-up with your regular provider/clinic as directed; this is very important.    If you were given a prescription for medicine here today, be sure to read all of the information (including the package insert) that comes with your prescription.  This will include important information about the medicine, its side effects, and any warnings that you need to know about.  The pharmacist who fills the prescription can provide more information and answer questions you may have about the medicine.  If you have questions  or concerns that the pharmacist cannot address, please call or return to the Emergency Department.       Remember that you can always come back to the Emergency Department if you are not able to see your regular provider in the amount of time listed above, if you get any new symptoms, or if there is anything that worries you.

## 2020-01-09 NOTE — ED NOTES
Bed: ED23  Expected date:   Expected time:   Means of arrival:   Comments:  [facility] - 69F CP - ETA 12min

## 2020-01-09 NOTE — ED PROVIDER NOTES
History     Chief Complaint:  Chest Pain    HPI   Nadia Hines is a 69 year old female with a history of sleep apnea, peptic ulcer disease, anemia, basal cell carcinoma, chronic fatigue, non-rheumatic mitral regurgitation, among others, who presents with chest pain via EMS. The patient reports that this morning she woke up at 0500 and was going about her usual routine, reading the paper and drinking coffee, when at 0545 she had an onset of chest pain described as a pressure sensation that radiated up to her teeth, shoulders, and upper back. The patient endorses also feeling lightheaded, nauseated, and short of breath at the time to the point that she had to sit 15 minutes prior being able to get up. The patient then went for a walk and showered prior to presenting herself to urgent care. She states that when she was at urgent care all her symptoms had resolved except for feeling lightheaded and the upper back pain. Urgent called 911 for transfer to the emergency department for further evaluation and en route she had a brief episode of these symptoms again, but currently the pain has resolved. The patient denies leg swelling, urinary symptoms, fever, chills, or any recent lifestyle or medication changes. The patient lastly recalls that a few years ago she had a similar, smaller episode of similar symptoms but did not present herself to her primary care until the following day. At that time she was recommended to call 911 if she ever had these symptoms re-occur.    Cardiac/PE/DVT Risk Factors:  History of hypertension - Negative   History of hyperlipidemia - Positive   History of diabetes - Negative   History of smoking - Positive   Personal history of PE/DVT - Negative   Family history of PE/DVT - Negative   Family history of heart complications - Positive   Recent travel - Negative   Recent surgery - Positive (eye surgery 1 month ago)  Other immobilizations - Negative   Cancer - Positive      EKG [facility]:  ECG taken at 0917 1/9/2020  Normal sinus rhythm  Normal ECG  Rate 74 bpm. IA interval 180 ms. QRS duration 82 ms. QT/QTc 364/404 ms. P-R-T axes 62 1 78.     Allergies:  Codeine   Mirtazapine   Sertraline   SSRIS     Medications:    Celexa  Xanax  Estrace    Past Medical History:    Non-rheumatic mitral regurgitation   Synovial cyst right knee   Sleep apnea   Malignant neoplasm of cervix uteri   Chronic fatigue   Depression  High cholesterol   intestinal obstruction  Anemia   Hydronephrosis   Peptic ulcer disease   Restless leg   Basal cell carcinoma     Past Surgical History:    MOHS surgery   Hysterectomy  Appendectomy   Small bowel resection   Colonoscopy   Breast augmentation     Family History:    Brother: hypertension  Daughter: committed suicide at age 32  Father: heart disease  Maternal grandmother: breast cancer   Mother: breast cancer     Social History:  The patient was accompanied to the ED by EMS,  followed.  Smoking Status: Current Some Day Smoker  Smokeless Tobacco: Never Used  Alcohol Use: Positive  Drug Use: Negative    Review of Systems   Constitutional: Negative for chills and fever.   HENT:        Radiating pain to teeth   Respiratory: Positive for shortness of breath.    Cardiovascular: Positive for chest pain. Negative for leg swelling.   Gastrointestinal: Positive for nausea.   Genitourinary: Negative.    Musculoskeletal: Positive for back pain.        Shoulder pain   Neurological: Positive for light-headedness.   All other systems reviewed and are negative.    Physical Exam     Patient Vitals for the past 24 hrs:   BP Temp Temp src Pulse Heart Rate Resp SpO2 Height Weight   01/09/20 1307 -- -- -- -- -- -- 98 % -- --   01/09/20 1230 (!) 155/88 -- -- 64 65 11 95 % -- --   01/09/20 1025 128/65 98.7  F (37.1  C) Oral (!) 41 -- -- 98 % 1.651 m (5' 5\") 67.6 kg (149 lb)      Physical Exam  General: Alert and interactive. Appears well. Cooperative and pleasant.   Eyes: The " pupils are equal and round. EOMs intact. No scleral icterus.  ENT: No abnormalities to the external nose or ears. Mucous membranes moist. Posterior oropharynx is non-erythematous.      Neck: Trachea is in the midline. No nuchal rigidity.     CV: PVCs every other beat. S1 and S2 normal without murmur, click, gallop or rub.   Resp: Breath sounds are clear bilaterally, without rhonchi, wheezes, rales. Non-labored, no retractions or accessory muscle use.     GI: Abdomen is soft without distension. No tenderness to palpation. No peritoneal signs.    MS: Moving all extremities well. Good muscle tone.   Skin: Warm and dry. No rash or lesions noted.  Neuro: Alert and oriented x 3. No focal neurologic deficits. Good strength and sensation in upper and lower extremities.    Psych: Awake. Alert.  Normal affect. Appropriate interactions.  Lymph: No anterior or posterior cervical lymphadenopathy noted.    Emergency Department Course     ECG:  ECG taken at 1103  Sinus rhythm with frequent premature ventricular complexes in a pattern of bigeminy   Possible left atrial enlargement   Septal infarct, age undetermined   Abnormal ECG  Rate 79 bpm. FL interval 172 ms. QRS duration 82 ms. QT/QTc 376/431 ms. P-R-T axes 68 49 81.    Imaging:  Radiology findings were communicated with the patient who voiced understanding of the findings.    Chest XR,  PA & LAT  No pleural fluid or pneumothorax. No airspace disease or  edema. Normal size of the heart.  LESTER J FAHRNER, MD  Reading per radiology      Laboratory:  Laboratory findings were communicated with the patient who voiced understanding of the findings.    CBC: WBC 4.9, HGB 14.1,   BMP: Glucose 102 (H), o/w WNL (Creatinine 0.81)  Dimer: <0.3  Troponin (Collected 1039): <0.015   Troponin (Collected 1254): <0.015     Emergency Department Course:    1038 Nursing notes and vitals reviewed. I performed an exam of the patient as documented above.     1039 IV was inserted and blood was  drawn for laboratory testing, results above.     1103 EKG obtained as noted above.     1216 Patient rechecked and updated.      1245 The patient was sent for a XR while in the emergency department, results above.      1335 Patient rechecked and updated. Prior to discharge, I personally reviewed the results with the patient and all related questions were answered. The patient verbalized understanding and is amenable to plan.     Impression & Plan      Medical Decision Making:  aNdia Hines is a 69 year old female who presents to the emergency department today for evaluation of chest pain.The workup in the Emergency Department is negative and reassuring. The differential is broad, including ACS, PE, cardiac tamponade, aortic dissection, pneumonia, pneumothorax, pericarditis, esophageal rupture, and others. Pain has completely subsided prior to presentation, and this is clinically inconsistent with AAA rupture or aortic dissection.    EKG shows NSR without signs of ischemia and infarction and two troponins are negative, and thus I doubt ACS. She is intermittently in bigeminy. Dimer is negative, and I doubt PE. Additionally, the patient has no signs of tachypnea, tachycardia, or hypoxia. No signs of leukocytosis, anemia, renal dysfunction, electrolyte abnormalities and chest x-ray shows no signs of pneumonia, pneumothorax, or pleural effusions.     The patient has a HEART score of 2, and I believe she is stable for outpatient follow up. Holter monitor placed prior to discharge for evaluation of bigeminy. Otherwise, close follow up with PCP indicated for possible stress ECHO as an outpatient. Return here for worsening chest pain, shortness of breath, vomiting, fevers, or other concerns.     Diagnosis:    ICD-10-CM    1. Acute chest pain R07.9    2. Bigeminy I49.9      Disposition:   The patient is discharged to home.     Discharge Medications:  No discharge medications.     Scribe Disclosure:  I, Orla Severson, am  serving as a scribe at 10:46 AM on 1/9/2020 to document services personally performed by Caroline Ruiz PA-C based on my observations and the provider's statements to me.    EMERGENCY DEPARTMENT       Caroline Ruiz PA-C  01/09/20 1808

## 2020-01-09 NOTE — ED AVS SNAPSHOT
Emergency Department  [address] [place] [id number]  Phone:  [phone]  Fax:  [phone]                                    Nadia Hines   MRN: [id number]    Department:   Emergency Department   Date of Visit:  1/9/2020           After Visit Summary Signature Page    I have received my discharge instructions, and my questions have been answered. I have discussed any challenges I see with this plan with the nurse or doctor.    ..........................................................................................................................................  Patient/Patient Representative Signature      ..........................................................................................................................................  Patient Representative Print Name and Relationship to Patient    ..................................................               ................................................  Date                                   Time    ..........................................................................................................................................  Reviewed by Signature/Title    ...................................................              ..............................................  Date                                               Time          22EPIC Rev 08/18

## 2021-12-07 DIAGNOSIS — Z11.59 ENCOUNTER FOR SCREENING FOR OTHER VIRAL DISEASES: ICD-10-CM

## 2021-12-13 ENCOUNTER — TRANSFERRED RECORDS (OUTPATIENT)
Dept: MULTI SPECIALTY CLINIC | Facility: CLINIC | Age: 71
End: 2021-12-13
Payer: COMMERCIAL

## 2021-12-13 LAB — HBA1C MFR BLD: 5.6 %

## 2021-12-17 RX ORDER — RIZATRIPTAN BENZOATE 10 MG/1
10 TABLET ORAL
COMMUNITY

## 2021-12-20 ENCOUNTER — HOSPITAL ENCOUNTER (OUTPATIENT)
Facility: CLINIC | Age: 71
Discharge: HOME | End: 2021-12-20
Attending: ORTHOPAEDIC SURGERY | Admitting: ORTHOPAEDIC SURGERY
Payer: MEDICARE

## 2021-12-20 ENCOUNTER — APPOINTMENT (OUTPATIENT)
Dept: GENERAL RADIOLOGY | Facility: CLINIC | Age: 71
End: 2021-12-20
Attending: ORTHOPAEDIC SURGERY
Payer: MEDICARE

## 2021-12-20 ENCOUNTER — ANESTHESIA (OUTPATIENT)
Dept: SURGERY | Facility: CLINIC | Age: 71
End: 2021-12-20
Payer: MEDICARE

## 2021-12-20 ENCOUNTER — ANESTHESIA EVENT (OUTPATIENT)
Dept: SURGERY | Facility: CLINIC | Age: 71
End: 2021-12-20
Payer: MEDICARE

## 2021-12-20 VITALS
WEIGHT: 155.6 LBS | OXYGEN SATURATION: 94 % | DIASTOLIC BLOOD PRESSURE: 58 MMHG | SYSTOLIC BLOOD PRESSURE: 112 MMHG | TEMPERATURE: 97 F | RESPIRATION RATE: 12 BRPM | HEIGHT: 65 IN | HEART RATE: 65 BPM | BODY MASS INDEX: 25.92 KG/M2

## 2021-12-20 DIAGNOSIS — M48.062 LUMBAR STENOSIS WITH NEUROGENIC CLAUDICATION: Primary | ICD-10-CM

## 2021-12-20 PROCEDURE — 272N000001 HC OR GENERAL SUPPLY STERILE: Performed by: ORTHOPAEDIC SURGERY

## 2021-12-20 PROCEDURE — 250N000011 HC RX IP 250 OP 636: Performed by: REGISTERED NURSE

## 2021-12-20 PROCEDURE — 250N000011 HC RX IP 250 OP 636: Performed by: ORTHOPAEDIC SURGERY

## 2021-12-20 PROCEDURE — 250N000013 HC RX MED GY IP 250 OP 250 PS 637: Performed by: ORTHOPAEDIC SURGERY

## 2021-12-20 PROCEDURE — 370N000017 HC ANESTHESIA TECHNICAL FEE, PER MIN: Performed by: ORTHOPAEDIC SURGERY

## 2021-12-20 PROCEDURE — 250N000009 HC RX 250: Performed by: ORTHOPAEDIC SURGERY

## 2021-12-20 PROCEDURE — 360N000084 HC SURGERY LEVEL 4 W/ FLUORO, PER MIN: Performed by: ORTHOPAEDIC SURGERY

## 2021-12-20 PROCEDURE — 999N000141 HC STATISTIC PRE-PROCEDURE NURSING ASSESSMENT: Performed by: ORTHOPAEDIC SURGERY

## 2021-12-20 PROCEDURE — 710N000009 HC RECOVERY PHASE 1, LEVEL 1, PER MIN: Performed by: ORTHOPAEDIC SURGERY

## 2021-12-20 PROCEDURE — 250N000011 HC RX IP 250 OP 636: Performed by: ANESTHESIOLOGY

## 2021-12-20 PROCEDURE — 999N000179 XR SURGERY CARM FLUORO LESS THAN 5 MIN W STILLS

## 2021-12-20 PROCEDURE — 250N000009 HC RX 250: Performed by: REGISTERED NURSE

## 2021-12-20 PROCEDURE — 258N000003 HC RX IP 258 OP 636: Performed by: REGISTERED NURSE

## 2021-12-20 PROCEDURE — 250N000025 HC SEVOFLURANE, PER MIN: Performed by: ORTHOPAEDIC SURGERY

## 2021-12-20 PROCEDURE — 710N000012 HC RECOVERY PHASE 2, PER MINUTE: Performed by: ORTHOPAEDIC SURGERY

## 2021-12-20 RX ORDER — ONDANSETRON 2 MG/ML
INJECTION INTRAMUSCULAR; INTRAVENOUS PRN
Status: DISCONTINUED | OUTPATIENT
Start: 2021-12-20 | End: 2021-12-20

## 2021-12-20 RX ORDER — NEOSTIGMINE METHYLSULFATE 1 MG/ML
VIAL (ML) INJECTION PRN
Status: DISCONTINUED | OUTPATIENT
Start: 2021-12-20 | End: 2021-12-20

## 2021-12-20 RX ORDER — FENTANYL CITRATE 50 UG/ML
25 INJECTION, SOLUTION INTRAMUSCULAR; INTRAVENOUS
Status: DISCONTINUED | OUTPATIENT
Start: 2021-12-20 | End: 2021-12-20 | Stop reason: HOSPADM

## 2021-12-20 RX ORDER — DEXAMETHASONE SODIUM PHOSPHATE 4 MG/ML
INJECTION, SOLUTION INTRA-ARTICULAR; INTRALESIONAL; INTRAMUSCULAR; INTRAVENOUS; SOFT TISSUE PRN
Status: DISCONTINUED | OUTPATIENT
Start: 2021-12-20 | End: 2021-12-20

## 2021-12-20 RX ORDER — ONDANSETRON 2 MG/ML
4 INJECTION INTRAMUSCULAR; INTRAVENOUS EVERY 30 MIN PRN
Status: DISCONTINUED | OUTPATIENT
Start: 2021-12-20 | End: 2021-12-20 | Stop reason: HOSPADM

## 2021-12-20 RX ORDER — PROPOFOL 10 MG/ML
INJECTION, EMULSION INTRAVENOUS CONTINUOUS PRN
Status: DISCONTINUED | OUTPATIENT
Start: 2021-12-20 | End: 2021-12-20

## 2021-12-20 RX ORDER — ONDANSETRON 4 MG/1
4 TABLET, ORALLY DISINTEGRATING ORAL EVERY 30 MIN PRN
Status: DISCONTINUED | OUTPATIENT
Start: 2021-12-20 | End: 2021-12-20 | Stop reason: HOSPADM

## 2021-12-20 RX ORDER — OXYCODONE HYDROCHLORIDE 5 MG/1
5 TABLET ORAL
Status: COMPLETED | OUTPATIENT
Start: 2021-12-20 | End: 2021-12-20

## 2021-12-20 RX ORDER — CEFAZOLIN SODIUM 2 G/100ML
2 INJECTION, SOLUTION INTRAVENOUS SEE ADMIN INSTRUCTIONS
Status: DISCONTINUED | OUTPATIENT
Start: 2021-12-20 | End: 2021-12-20 | Stop reason: HOSPADM

## 2021-12-20 RX ORDER — LIDOCAINE HYDROCHLORIDE 20 MG/ML
INJECTION, SOLUTION INFILTRATION; PERINEURAL PRN
Status: DISCONTINUED | OUTPATIENT
Start: 2021-12-20 | End: 2021-12-20

## 2021-12-20 RX ORDER — EPHEDRINE SULFATE 50 MG/ML
INJECTION, SOLUTION INTRAMUSCULAR; INTRAVENOUS; SUBCUTANEOUS PRN
Status: DISCONTINUED | OUTPATIENT
Start: 2021-12-20 | End: 2021-12-20

## 2021-12-20 RX ORDER — CEFAZOLIN SODIUM 2 G/100ML
2 INJECTION, SOLUTION INTRAVENOUS
Status: COMPLETED | OUTPATIENT
Start: 2021-12-20 | End: 2021-12-20

## 2021-12-20 RX ORDER — HYDROMORPHONE HCL IN WATER/PF 6 MG/30 ML
0.2 PATIENT CONTROLLED ANALGESIA SYRINGE INTRAVENOUS EVERY 5 MIN PRN
Status: DISCONTINUED | OUTPATIENT
Start: 2021-12-20 | End: 2021-12-20 | Stop reason: HOSPADM

## 2021-12-20 RX ORDER — MEPERIDINE HYDROCHLORIDE 25 MG/ML
12.5 INJECTION INTRAMUSCULAR; INTRAVENOUS; SUBCUTANEOUS
Status: DISCONTINUED | OUTPATIENT
Start: 2021-12-20 | End: 2021-12-20 | Stop reason: HOSPADM

## 2021-12-20 RX ORDER — BUPIVACAINE HYDROCHLORIDE 5 MG/ML
INJECTION, SOLUTION EPIDURAL; INTRACAUDAL
Status: DISCONTINUED
Start: 2021-12-20 | End: 2021-12-20 | Stop reason: HOSPADM

## 2021-12-20 RX ORDER — AMOXICILLIN 250 MG
1-2 CAPSULE ORAL 2 TIMES DAILY
Qty: 28 TABLET | Refills: 0 | Status: SHIPPED | OUTPATIENT
Start: 2021-12-20 | End: 2021-12-27

## 2021-12-20 RX ORDER — FENTANYL CITRATE 50 UG/ML
INJECTION, SOLUTION INTRAMUSCULAR; INTRAVENOUS PRN
Status: DISCONTINUED | OUTPATIENT
Start: 2021-12-20 | End: 2021-12-20

## 2021-12-20 RX ORDER — SODIUM CHLORIDE, SODIUM LACTATE, POTASSIUM CHLORIDE, CALCIUM CHLORIDE 600; 310; 30; 20 MG/100ML; MG/100ML; MG/100ML; MG/100ML
INJECTION, SOLUTION INTRAVENOUS CONTINUOUS
Status: DISCONTINUED | OUTPATIENT
Start: 2021-12-20 | End: 2021-12-20 | Stop reason: HOSPADM

## 2021-12-20 RX ORDER — PROPOFOL 10 MG/ML
INJECTION, EMULSION INTRAVENOUS PRN
Status: DISCONTINUED | OUTPATIENT
Start: 2021-12-20 | End: 2021-12-20

## 2021-12-20 RX ORDER — FENTANYL CITRATE 50 UG/ML
25 INJECTION, SOLUTION INTRAMUSCULAR; INTRAVENOUS EVERY 5 MIN PRN
Status: DISCONTINUED | OUTPATIENT
Start: 2021-12-20 | End: 2021-12-20 | Stop reason: HOSPADM

## 2021-12-20 RX ORDER — SODIUM CHLORIDE, SODIUM LACTATE, POTASSIUM CHLORIDE, CALCIUM CHLORIDE 600; 310; 30; 20 MG/100ML; MG/100ML; MG/100ML; MG/100ML
INJECTION, SOLUTION INTRAVENOUS CONTINUOUS PRN
Status: DISCONTINUED | OUTPATIENT
Start: 2021-12-20 | End: 2021-12-20

## 2021-12-20 RX ORDER — OXYCODONE HYDROCHLORIDE 5 MG/1
5-10 TABLET ORAL EVERY 4 HOURS PRN
Qty: 12 TABLET | Refills: 0 | Status: SHIPPED | OUTPATIENT
Start: 2021-12-20

## 2021-12-20 RX ORDER — ACETAMINOPHEN 500 MG
1000 TABLET ORAL ONCE
Status: DISCONTINUED | OUTPATIENT
Start: 2021-12-20 | End: 2021-12-20 | Stop reason: HOSPADM

## 2021-12-20 RX ORDER — GLYCOPYRROLATE 0.2 MG/ML
INJECTION, SOLUTION INTRAMUSCULAR; INTRAVENOUS PRN
Status: DISCONTINUED | OUTPATIENT
Start: 2021-12-20 | End: 2021-12-20

## 2021-12-20 RX ORDER — EPINEPHRINE 1 MG/ML
INJECTION, SOLUTION INTRAMUSCULAR; SUBCUTANEOUS
Status: DISCONTINUED
Start: 2021-12-20 | End: 2021-12-20 | Stop reason: HOSPADM

## 2021-12-20 RX ORDER — OXYCODONE HYDROCHLORIDE 5 MG/1
5 TABLET ORAL EVERY 4 HOURS PRN
Status: DISCONTINUED | OUTPATIENT
Start: 2021-12-20 | End: 2021-12-20 | Stop reason: HOSPADM

## 2021-12-20 RX ORDER — ACETAMINOPHEN 325 MG/1
975 TABLET ORAL ONCE
Status: COMPLETED | OUTPATIENT
Start: 2021-12-20 | End: 2021-12-20

## 2021-12-20 RX ORDER — BUPIVACAINE HYDROCHLORIDE AND EPINEPHRINE 5; 5 MG/ML; UG/ML
INJECTION, SOLUTION PERINEURAL PRN
Status: DISCONTINUED | OUTPATIENT
Start: 2021-12-20 | End: 2021-12-20 | Stop reason: HOSPADM

## 2021-12-20 RX ADMIN — CEFAZOLIN SODIUM 2 G: 2 INJECTION, SOLUTION INTRAVENOUS at 07:33

## 2021-12-20 RX ADMIN — DEXAMETHASONE SODIUM PHOSPHATE 4 MG: 4 INJECTION, SOLUTION INTRA-ARTICULAR; INTRALESIONAL; INTRAMUSCULAR; INTRAVENOUS; SOFT TISSUE at 07:44

## 2021-12-20 RX ADMIN — FENTANYL CITRATE 25 MCG: 50 INJECTION, SOLUTION INTRAMUSCULAR; INTRAVENOUS at 11:13

## 2021-12-20 RX ADMIN — LIDOCAINE HYDROCHLORIDE 80 MG: 20 INJECTION, SOLUTION INFILTRATION; PERINEURAL at 07:39

## 2021-12-20 RX ADMIN — MIDAZOLAM 1 MG: 1 INJECTION INTRAMUSCULAR; INTRAVENOUS at 07:35

## 2021-12-20 RX ADMIN — PHENYLEPHRINE HYDROCHLORIDE 100 MCG: 10 INJECTION INTRAVENOUS at 09:43

## 2021-12-20 RX ADMIN — ACETAMINOPHEN 975 MG: 325 TABLET, FILM COATED ORAL at 06:20

## 2021-12-20 RX ADMIN — PHENYLEPHRINE HYDROCHLORIDE 100 MCG: 10 INJECTION INTRAVENOUS at 08:57

## 2021-12-20 RX ADMIN — HYDROMORPHONE HYDROCHLORIDE 0.25 MG: 1 INJECTION, SOLUTION INTRAMUSCULAR; INTRAVENOUS; SUBCUTANEOUS at 09:02

## 2021-12-20 RX ADMIN — ROCURONIUM BROMIDE 10 MG: 50 INJECTION, SOLUTION INTRAVENOUS at 09:31

## 2021-12-20 RX ADMIN — Medication 5 MG: at 08:00

## 2021-12-20 RX ADMIN — SODIUM CHLORIDE, POTASSIUM CHLORIDE, SODIUM LACTATE AND CALCIUM CHLORIDE: 600; 310; 30; 20 INJECTION, SOLUTION INTRAVENOUS at 07:33

## 2021-12-20 RX ADMIN — ROCURONIUM BROMIDE 10 MG: 50 INJECTION, SOLUTION INTRAVENOUS at 08:59

## 2021-12-20 RX ADMIN — ROCURONIUM BROMIDE 10 MG: 50 INJECTION, SOLUTION INTRAVENOUS at 10:01

## 2021-12-20 RX ADMIN — SODIUM CHLORIDE, POTASSIUM CHLORIDE, SODIUM LACTATE AND CALCIUM CHLORIDE: 600; 310; 30; 20 INJECTION, SOLUTION INTRAVENOUS at 09:10

## 2021-12-20 RX ADMIN — Medication 5 MG: at 08:43

## 2021-12-20 RX ADMIN — Medication 5 MG: at 09:08

## 2021-12-20 RX ADMIN — Medication 8 MCG: at 10:14

## 2021-12-20 RX ADMIN — GLYCOPYRROLATE 0.5 MG: 0.2 INJECTION, SOLUTION INTRAMUSCULAR; INTRAVENOUS at 10:30

## 2021-12-20 RX ADMIN — NEOSTIGMINE METHYLSULFATE 3.5 MG: 1 INJECTION, SOLUTION INTRAVENOUS at 10:30

## 2021-12-20 RX ADMIN — HYDROMORPHONE HYDROCHLORIDE 0.25 MG: 1 INJECTION, SOLUTION INTRAMUSCULAR; INTRAVENOUS; SUBCUTANEOUS at 08:15

## 2021-12-20 RX ADMIN — ROCURONIUM BROMIDE 10 MG: 50 INJECTION, SOLUTION INTRAVENOUS at 09:47

## 2021-12-20 RX ADMIN — PROPOFOL 50 MG: 10 INJECTION, EMULSION INTRAVENOUS at 10:25

## 2021-12-20 RX ADMIN — ROCURONIUM BROMIDE 50 MG: 50 INJECTION, SOLUTION INTRAVENOUS at 07:39

## 2021-12-20 RX ADMIN — ROCURONIUM BROMIDE 10 MG: 50 INJECTION, SOLUTION INTRAVENOUS at 08:30

## 2021-12-20 RX ADMIN — FENTANYL CITRATE 100 MCG: 50 INJECTION, SOLUTION INTRAMUSCULAR; INTRAVENOUS at 07:39

## 2021-12-20 RX ADMIN — PHENYLEPHRINE HYDROCHLORIDE 100 MCG: 10 INJECTION INTRAVENOUS at 08:31

## 2021-12-20 RX ADMIN — ROCURONIUM BROMIDE 10 MG: 50 INJECTION, SOLUTION INTRAVENOUS at 08:48

## 2021-12-20 RX ADMIN — ROCURONIUM BROMIDE 20 MG: 50 INJECTION, SOLUTION INTRAVENOUS at 09:16

## 2021-12-20 RX ADMIN — ONDANSETRON 4 MG: 2 INJECTION INTRAMUSCULAR; INTRAVENOUS at 08:16

## 2021-12-20 RX ADMIN — FENTANYL CITRATE 25 MCG: 50 INJECTION, SOLUTION INTRAMUSCULAR; INTRAVENOUS at 11:21

## 2021-12-20 RX ADMIN — FENTANYL CITRATE 25 MCG: 50 INJECTION, SOLUTION INTRAMUSCULAR; INTRAVENOUS at 11:04

## 2021-12-20 RX ADMIN — PHENYLEPHRINE HYDROCHLORIDE 100 MCG: 10 INJECTION INTRAVENOUS at 08:22

## 2021-12-20 RX ADMIN — Medication 5 MG: at 09:10

## 2021-12-20 RX ADMIN — Medication 5 MG: at 08:04

## 2021-12-20 RX ADMIN — PHENYLEPHRINE HYDROCHLORIDE 100 MCG: 10 INJECTION INTRAVENOUS at 10:07

## 2021-12-20 RX ADMIN — OXYCODONE HYDROCHLORIDE 5 MG: 5 TABLET ORAL at 11:39

## 2021-12-20 RX ADMIN — PROPOFOL 150 MG: 10 INJECTION, EMULSION INTRAVENOUS at 07:39

## 2021-12-20 RX ADMIN — PHENYLEPHRINE HYDROCHLORIDE 100 MCG: 10 INJECTION INTRAVENOUS at 09:55

## 2021-12-20 RX ADMIN — FENTANYL CITRATE 25 MCG: 50 INJECTION, SOLUTION INTRAMUSCULAR; INTRAVENOUS at 10:58

## 2021-12-20 RX ADMIN — PHENYLEPHRINE HYDROCHLORIDE 100 MCG: 10 INJECTION INTRAVENOUS at 09:16

## 2021-12-20 RX ADMIN — Medication 5 MG: at 08:41

## 2021-12-20 RX ADMIN — PROPOFOL 50 MCG/KG/MIN: 10 INJECTION, EMULSION INTRAVENOUS at 07:45

## 2021-12-20 RX ADMIN — ROCURONIUM BROMIDE 20 MG: 50 INJECTION, SOLUTION INTRAVENOUS at 08:15

## 2021-12-20 ASSESSMENT — MIFFLIN-ST. JEOR: SCORE: 1226.68

## 2021-12-20 ASSESSMENT — LIFESTYLE VARIABLES: TOBACCO_USE: 1

## 2021-12-20 ASSESSMENT — ENCOUNTER SYMPTOMS: SEIZURES: 0

## 2021-12-20 NOTE — OP NOTE
Orthopedic Surgery Operative Report    Patient:   Nadia Hines  MRN:   [id number]   :  1950  Facility: [facility]   Date:  21         PREOPERATIVE DIAGNOSIS:    1. Central stenosis L3-4 with neurogenic claudication  2. Bilateral lateral recess stenosis L4-5 with right greater than left L5 radicular symptoms    POSTOPERATIVE DIAGNOSIS:    1. Central stenosis L3-4 with neurogenic claudication  2. Bilateral lateral recess stenosis L4-5 with right greater than left L5 radicular symptoms      PROCEDURE PERFORMED:    1. Bilateral laminotomies L3-4  2. Bilateral laminotomies L4-5    SURGEON:    Curtis Spain MD    SURGICAL ASSISTANT:    None    ANESTHESIA:  General    FINDINGS:    Severe central stenosis L3-4.  Severe lateral recess stenosis bilateral L4-5.  No ongoing compression of the thecal sac or traversing nerve roots at case conclusion.    COMPLICATIONS:     None apparent    SPECIMENS:    None    ESTIMATED BLOOD LOSS:  10 cc    IMPLANTS:    None    INDICATION FOR OPERATION:  The patient is a 71-year-old female with symptoms of neurogenic claudication as well as bilateral L5 distribution radicular pain.  Conservative measures were not effective in controlling her symptoms.  I discussed with her the above operation as a means to decompress the compressed neural elements.    DESCRIPTION OF PROCEDURE:    The patient was met in the preoperative holding area and the operative site was confirmed and marked.  We once again reviewed the risks, benefits, and alternatives of the operation and the patient wished to proceed with the surgery.    The patient was taken back to the operating room and induced under general anesthesia.  The patient was positioned prone on the Riccardo frame with all bony prominences well padded.  The surgical site was prepped and draped in the usual standard fashion.    I used a spinal needle for localization with fluoroscopy of the L3-4 and L4-5 levels.  I  then injected local anesthetic and carried dissection down to the spinous processes.  I created a small rent in the myofascia on each side of the spinous process at the L3-4 and then the L4-5 levels.  At both levels I started by introducing the first METRx dilator down onto the lamina and confirming location with an x-ray.  I then dilated up to a 22 mm tubular retractor.    Starting at the L3-4 level, I performed the following:  With the tubular retractor first docked on the left, I performed a left hemilaminotomy using the Midas triston and Kerrison rongeurs, undercutting the spinous process to decompress centrally, and performed an ipsilateral partial medial facetectomy while taking care to preserve enough pars and facet to minimize the risk of postoperative fracture.  I released the ligamentum flavum from the undersurface of the cranial and caudal lamina, and then the facet ipsilaterally.  I removed the detached ligamentum flavum.  I inspected the neural elements, and resected additional bone from the lateral recess and foramen as necessary to ensure complete decompression of the exiting and traversing nerve roots.  I used a Priyank to feel into the foramen and confirmed there was adequate decompression.  I then placed Gelfoam sponges soaked in thrombin loosely over the neural elements and confirmed no ongoing bleeding.  I then withdrew the tubular retractor slowly, achieving full hemostasis at each tissue level.  I then repeated the process including dilation on the right side, and once again performed a right hemilaminotomy L3-4 with the above technique again repeated.    I then repeated the same decompression technique, with bilateral docking of the tubular retractor at the L4-5 level, starting with a hemilaminotomy on the right and then moving to the left to complete a left-sided hemilaminotomy as well.  At the L4-5 level bilaterally I placed thrombin-soaked Gelfoam sponges loosely over the neural elements.    I  checked each of my laminotomy sites, and confirmed there was no ongoing bleeding in any of them as confirmed by visible Gelfoam patties at the base of the operative sites.    The deep fascia was closed with a running 0-vicyl suture in a watertight fashion, and the subcutaneous tissues were closed with 2-0 vicryl interrupted sutures.  Exofin and steri strips were applied.  The patient was transferred off of the operative table and allowed to emerge from anesthesia.  The patient was extubated and transported to PACU in stable condition.          All sponge and needle counts were correct at case conclusion.      POSTOPERATIVE PLAN:  -Activity:   Up with assist  -Weight Bearing Status: WBAT   -Bracing:   None  -Pain control:   Rx for oxycodone  -Dressing:   Ok to shower with dressing in place, dressing ok to get wet.  -Diet:    ADAT    -Disposition:   Home from PACU  -Follow up:   2 weeks in my clinic       Curtis Spain MD

## 2021-12-20 NOTE — ANESTHESIA CARE TRANSFER NOTE
Patient: Nadia Hines    Procedure: Procedure(s):  BILATERAL LUMBAR 3 TO LUMBAR 5 LAMINOTOMY       Diagnosis: Pseudoclaudication syndrome [M48.062]  Lumbar radiculopathy [M54.16]  Diagnosis Additional Information: No value filed.    Anesthesia Type:   General     Note:    Oropharynx: oropharynx clear of all foreign objects  Level of Consciousness: awake  Oxygen Supplementation: face mask  Level of Supplemental Oxygen (L/min / FiO2): 6  Independent Airway: airway patency satisfactory and stable  Dentition: dentition unchanged  Vital Signs Stable: post-procedure vital signs reviewed and stable  Report to RN Given: handoff report given  Patient transferred to: PACU  Comments: Patient comfortable  Handoff Report: Identifed the Patient, Identified the Reponsible Provider, Reviewed the pertinent medical history, Discussed the surgical course, Reviewed Intra-OP anesthesia mangement and issues during anesthesia, Set expectations for post-procedure period and Allowed opportunity for questions and acknowledgement of understanding      Vitals:  Vitals Value Taken Time   BP     Temp     Pulse 75 12/20/21 1042   Resp 13 12/20/21 1042   SpO2 98 % 12/20/21 1042   Vitals shown include unvalidated device data.    Electronically Signed By: VAZQUEZ Haynes CRNA  December 20, 2021  10:44 AM

## 2021-12-20 NOTE — ANESTHESIA POSTPROCEDURE EVALUATION
Patient: Nadia Hines    Procedure: Procedure(s):  BILATERAL LUMBAR 3 TO LUMBAR 5 LAMINOTOMY       Diagnosis:Pseudoclaudication syndrome [M48.062]  Lumbar radiculopathy [M54.16]  Diagnosis Additional Information: No value filed.    Anesthesia Type:  General    Note:  Disposition: Outpatient   Postop Pain Control: Uneventful            Sign Out: Well controlled pain   PONV: No   Neuro/Psych: Uneventful            Sign Out: Acceptable/Baseline neuro status   Airway/Respiratory: Uneventful            Sign Out: Acceptable/Baseline resp. status   CV/Hemodynamics: Uneventful            Sign Out: Acceptable CV status   Other NRE: NONE   DID A NON-ROUTINE EVENT OCCUR? No           Last vitals:  Vitals Value Taken Time   /75 12/20/21 1130   Temp 36.5  C (97.7  F) 12/20/21 1130   Pulse 69 12/20/21 1143   Resp 11 12/20/21 1143   SpO2 97 % 12/20/21 1142   Vitals shown include unvalidated device data.    Electronically Signed By: Ritesh Garrido MD  December 20, 2021  12:24 PM

## 2021-12-20 NOTE — ANESTHESIA PROCEDURE NOTES
Airway       Patient location during procedure: OR       Procedure Start/Stop Times: 12/20/2021 7:43 AM  Staff -        CRNA: Hattie Baker APRN CRNA       Performed By: CRNA  Consent for Airway        Urgency: elective  Indications and Patient Condition       Indications for airway management: gin-procedural       Induction type:intravenous       Mask difficulty assessment: 1 - vent by mask    Final Airway Details       Final airway type: endotracheal airway       Successful airway: ETT - single  Endotracheal Airway Details        ETT size (mm): 7.0       Cuffed: yes       Successful intubation technique: direct laryngoscopy       DL Blade Type: Maxwell 2       Grade View of Cords: 1       Adjucts: stylet       Position: Right       Measured from: gums/teeth       Secured at (cm): 21       Bite block used: None    Post intubation assessment        Placement verified by: capnometry, equal breath sounds and chest rise        Number of attempts at approach: 1       Number of other approaches attempted: 0       Secured with: pink tape       Ease of procedure: easy       Dentition: Intact and Unchanged

## 2021-12-20 NOTE — DISCHARGE INSTRUCTIONS
**Because you had anesthesia today and you have a history of sleep apnea, it is extremely important that you use your CPAP machine for the next 24 hours while napping or sleeping. When awake, take 10 deep breaths each commercial break.**    Today you were given 975 mg of Tylenol at 6:20am. The recommended daily maximum dose is 4000 mg.     Same Day Surgery Discharge Instructions for  Sedation and General Anesthesia       It's not unusual to feel dizzy, light-headed or faint for up to 24 hours after surgery or while taking pain medication.  If you have these symptoms: sit for a few minutes before standing and have someone assist you when you get up to walk or use the bathroom.      You should rest and relax for the next 24 hours. We recommend you make arrangements to have an adult stay with you for at least 24 hours after your discharge.  Avoid hazardous and strenuous activity.      DO NOT DRIVE any vehicle or operate mechanical equipment for 24 hours following the end of your surgery.  Even though you may feel normal, your reactions may be affected by the medication you have received.      Do not drink alcoholic beverages for 24 hours following surgery.       Slowly progress to your regular diet as you feel able. It's not unusual to feel nauseated and/or vomit after receiving anesthesia.  If you develop these symptoms, drink clear liquids (apple juice, ginger ale, broth, 7-up, etc. ) until you feel better.  If your nausea and vomiting persists for 24 hours, please notify your surgeon.        All narcotic pain medications, along with inactivity and anesthesia, can cause constipation. Drinking plenty of liquids and increasing fiber intake will help.      For any questions of a medical nature, call your surgeon.      Do not make important decisions for 24 hours.      If you had general anesthesia, you may have a sore throat for a couple of days related to the breathing tube used during surgery.  You may use Cepacol  lozenges to help with this discomfort.  If it worsens or if you develop a fever, contact your surgeon.       If you feel your pain is not well managed with the pain medications prescribed by your surgeon, please contact your surgeon's office to let them know so they can address your concerns.       CoVid 19 Information    We want to give you information regarding Covid. Please consult your primary care provider with any questions you might have.     Patient who have symptoms (cough, fever, or shortness of breath), need to isolate for 7 days from when symptoms started OR 72 hours after fever resolves (without fever reducing medications) AND improvement of respiratory symptoms (whichever is longer).      Isolate yourself at home (in own room/own bathroom if possible)    Do Not allow any visitors    Do Not go to work or school    Do Not go to [employer],  centers, shopping, or other public places.    Do Not shake hands.    Avoid close and intimate contact with others (hugging, kissing).    Follow CDC recommendations for household cleaning of frequently touched services.     After the initial 7 days, continue to isolate yourself from household members as much as possible. To continue decrease the risk of community spread and exposure, you and any members of your household should limit activities in public for 14 days after starting home isolation.     You can reference the following CDC link for helpful home isolation/care tips:  https://www.cdc.gov/coronavirus/2019-ncov/downloads/10Things.pdf    Protect Others:    Cover Your Mouth and Nose with a mask, disposable tissue or wash cloth to avoid spreading germs to others.    Wash your hands and face frequently with soap and water    Call Your Primary Doctor If: Breathing difficulty develops or you become worse.    For more information about COVID19 and options for caring for yourself at home, please visit the CDC website at  https://www.cdc.gov/coronavirus/2019-ncov/about/steps-when-sick.html  For more options for care at [facility], please visit our website at [email or web address]    **If you have questions or concerns about your procedure, call   Dr. Spain at [phone].**

## 2021-12-20 NOTE — ANESTHESIA PREPROCEDURE EVALUATION
Anesthesia Pre-Procedure Evaluation    Patient: Nadia Hines   MRN: [id number] : 1950        Preoperative Diagnosis: Pseudoclaudication syndrome [M48.062]  Lumbar radiculopathy [M54.16]    Procedure : Procedure(s):  BILATERAL LUMBAR 3 TO LUMBAR 5 LAMINOTOMY          Past Medical History:   Diagnosis Date     Arthritis      Chronic fatigue syndrome      Depression      High cholesterol      Idiopathic sleep related nonobstructive alveolar hypoventilation      Malignant neoplasm of uterus (H)      Sleep apnea     CPAP      Past Surgical History:   Procedure Laterality Date     ABDOMEN SURGERY      small bowel resection      APPENDECTOMY       ARTHROPLASTY KNEE Right 2017    Procedure: ARTHROPLASTY KNEE;  Surgeon: Gorge Williamson MD;  Location: [facility]     BREAST SURGERY      bilateral breast implants     GYN SURGERY      hysterectomy      Allergies   Allergen Reactions     Codeine Nausea and Vomiting     Zoloft [Sertraline] Other (See Comments)     MANIC symptoms     Remeron [Mirtazapine] Anxiety     MANIC      Social History     Tobacco Use     Smoking status: Current Every Day Smoker     Types: Cigars     Smokeless tobacco: Never Used     Tobacco comment: 2 cigars a day   Substance Use Topics     Alcohol use: Yes     Comment: 1 glasss of wine daily      Wt Readings from Last 1 Encounters:   21 70.6 kg (155 lb 9.6 oz)        Anesthesia Evaluation            ROS/MED HX  ENT/Pulmonary:     (+) sleep apnea, uses CPAP, tobacco use (2 cigars/day), Current use,     Neurologic:    (-) no seizures and no CVA   Cardiovascular:     (+) Dyslipidemia -----    METS/Exercise Tolerance: >4 METS    Hematologic:       Musculoskeletal:   (+) arthritis,     GI/Hepatic:    (-) GERD and liver disease   Renal/Genitourinary:    (-) renal disease   Endo:    (-) Type II DM, thyroid disease and obesity   Psychiatric/Substance Use:     (+) psychiatric history depression     Infectious Disease:        Malignancy:       Other:            Physical Exam    Airway        Mallampati: II   TM distance: > 3 FB   Neck ROM: full   Mouth opening: > 3 cm    Respiratory Devices and Support         Dental  no notable dental history         Cardiovascular          Rhythm and rate: regular and normal     Pulmonary   pulmonary exam normal                OUTSIDE LABS:  CBC:   Lab Results   Component Value Date    WBC 4.9 01/09/2020    WBC 5.2 10/03/2008    HGB 14.1 01/09/2020    HGB 11.6 (L) 02/15/2017    HCT 40.9 01/09/2020    HCT 41.4 10/03/2008     01/09/2020     02/16/2017     BMP:   Lab Results   Component Value Date     01/09/2020     02/13/2017    POTASSIUM 4.3 01/09/2020    POTASSIUM 4.2 02/13/2017    CHLORIDE 107 01/09/2020    CHLORIDE 110 (H) 02/13/2017    CO2 24 01/09/2020    CO2 26 02/13/2017    BUN 11 01/09/2020    BUN 13 02/13/2017    CR 0.81 01/09/2020    CR 0.68 02/16/2017     (H) 01/09/2020     (H) 02/13/2017     COAGS: No results found for: PTT, INR, FIBR  POC: No results found for: BGM, HCG, HCGS  HEPATIC: No results found for: ALBUMIN, PROTTOTAL, ALT, AST, GGT, ALKPHOS, BILITOTAL, BILIDIRECT, SHAYNA  OTHER:   Lab Results   Component Value Date    INDIRA 9.4 01/09/2020    TSH 1.56 10/03/2008       Anesthesia Plan    ASA Status:  2   NPO Status:  NPO Appropriate    Anesthesia Type: General.     - Airway: ETT   Induction: Intravenous, Propofol.   Maintenance: Balanced.        Consents    Anesthesia Plan(s) and associated risks, benefits, and realistic alternatives discussed. Questions answered and patient/representative(s) expressed understanding.    - Discussed:     - Discussed with:  Patient         Postoperative Care    Pain management: IV analgesics, Oral pain medications.   PONV prophylaxis: Ondansetron (or other 5HT-3), Dexamethasone or Solumedrol, Background Propofol Infusion     Comments:                Ritesh Garrido MD

## (undated) DEVICE — DRAPE MICROSCOPE LEICA 54X150" AR8033650

## (undated) DEVICE — DRSG STERI STRIP 1X5" R1548

## (undated) DEVICE — SPONGE SURGIFOAM 12 1972

## (undated) DEVICE — SU VICRYL 0 UR-6 27" J603H

## (undated) DEVICE — SU VICRYL 2-0 CT-2 CR 8X18" J726D

## (undated) DEVICE — ESU GROUND PAD UNIVERSAL W/O CORD

## (undated) DEVICE — TOURNIQUET CUFF 30" STERILE

## (undated) DEVICE — SU ETHIBOND 0 CTX CR  8X18" CX31D

## (undated) DEVICE — SU VICRYL 2-0 CP-1 27" UND J266H

## (undated) DEVICE — CAST PADDING 6" UNSTERILE 9046

## (undated) DEVICE — DRAPE MAYO STAND 23X54 8337

## (undated) DEVICE — MANIFOLD NEPTUNE 4 PORT 700-20

## (undated) DEVICE — GLOVE PROTEXIS POWDER FREE 8.0 ORTHOPEDIC 2D73ET80

## (undated) DEVICE — NDL SPINAL 18GA 3.5" 405184

## (undated) DEVICE — BLADE SAW SAGITTAL STRK 21X90X1.27MM HD SYS 6 6221-127-090

## (undated) DEVICE — BONE CEMENT MIXEVAC III HI VAC KIT  0206-015-000

## (undated) DEVICE — TOOL DISSECT MIDAS MR8 12CM TELESC MATCH DMD MR8-T12MH30D

## (undated) DEVICE — SU WND CLOSURE VLOC 180 ABS 0 24" GS-25 VLOCL0436

## (undated) DEVICE — POSITIONER PT PRONESAFE HEAD REST W/DERMAPROX INSERT 40599

## (undated) DEVICE — GLOVE PROTEXIS POWDER FREE 8.5 ORTHOPEDIC 2D73ET85

## (undated) DEVICE — DRSG GAUZE 4X4" 2187

## (undated) DEVICE — PACK TOTAL KNEE SOP15TKFSD

## (undated) DEVICE — BLADE SAW SAGITTAL STRK 29X83.5X1.27MM 4/2000 2108-183-000

## (undated) DEVICE — HOOD FLYTE W/PEELAWAY 408-800-100

## (undated) DEVICE — RX SURGIFLO HEMOSTATIC MATRIX W/THROMBIN 8ML 2994

## (undated) DEVICE — PREP CHLORAPREP 26ML TINTED ORANGE  260815

## (undated) DEVICE — GLOVE PROTEXIS W/NEU-THERA 8.0  2D73TE80

## (undated) DEVICE — BLADE CLIPPER 4412A

## (undated) DEVICE — ESU PENCIL W/HOLSTER E2350H

## (undated) DEVICE — PACK SPINE SM CUSTOM SNE15SSFSK

## (undated) DEVICE — BONE CLEANING TIP INTERPULSE  0210-010-000

## (undated) DEVICE — IMM KNEE 20" 0814-2660

## (undated) DEVICE — LINEN TOWEL PACK X5 5464

## (undated) DEVICE — SU VICRYL 2-0 CT-2 27" UND J269H

## (undated) DEVICE — GLOVE PROTEXIS W/NEU-THERA 7.0  2D73TE70

## (undated) DEVICE — DRSG XEROFORM 5X9" 8884431605

## (undated) DEVICE — GLOVE PROTEXIS W/NEU-THERA 8.5  2D73TE85

## (undated) DEVICE — GOWN IMPERVIOUS SPECIALTY XL/XLONG 39049

## (undated) DEVICE — DRAIN ROUND W/RESERV KIT JACKSON PRATT 10FR 400ML SU130-402D

## (undated) DEVICE — SYR 30ML LL W/O NDL

## (undated) DEVICE — DRAPE POUCH INSTRUMENT 1018

## (undated) DEVICE — DRAPE COVER C-ARM SEAMLESS SNAP-KAP 03-KP26 LATEX FREE

## (undated) DEVICE — SUCTION IRR SYSTEM W/O TIP INTERPULSE HANDPIECE 0210-100-000

## (undated) DEVICE — ESU ELEC BLADE 6" COATED/INSULATED E1455-6

## (undated) DEVICE — GLOVE PROTEXIS BLUE W/NEU-THERA 7.5  2D73EB75

## (undated) DEVICE — ESU ELEC BLADE 2.75" COATED/INSULATED E1455

## (undated) DEVICE — WRAP EZY KNEE

## (undated) RX ORDER — KETOROLAC TROMETHAMINE 30 MG/ML
INJECTION, SOLUTION INTRAMUSCULAR; INTRAVENOUS
Status: DISPENSED
Start: 2017-02-13

## (undated) RX ORDER — PROPOFOL 10 MG/ML
INJECTION, EMULSION INTRAVENOUS
Status: DISPENSED
Start: 2021-12-20

## (undated) RX ORDER — FENTANYL CITRATE 0.05 MG/ML
INJECTION, SOLUTION INTRAMUSCULAR; INTRAVENOUS
Status: DISPENSED
Start: 2021-12-20

## (undated) RX ORDER — NEOSTIGMINE METHYLSULFATE 1 MG/ML
VIAL (ML) INJECTION
Status: DISPENSED
Start: 2021-12-20

## (undated) RX ORDER — DEXAMETHASONE SODIUM PHOSPHATE 4 MG/ML
INJECTION, SOLUTION INTRA-ARTICULAR; INTRALESIONAL; INTRAMUSCULAR; INTRAVENOUS; SOFT TISSUE
Status: DISPENSED
Start: 2021-12-20

## (undated) RX ORDER — GLYCOPYRROLATE 0.2 MG/ML
INJECTION, SOLUTION INTRAMUSCULAR; INTRAVENOUS
Status: DISPENSED
Start: 2021-12-20

## (undated) RX ORDER — CEFAZOLIN SODIUM 2 G/100ML
INJECTION, SOLUTION INTRAVENOUS
Status: DISPENSED
Start: 2017-02-13

## (undated) RX ORDER — LIDOCAINE HYDROCHLORIDE 20 MG/ML
INJECTION, SOLUTION EPIDURAL; INFILTRATION; INTRACAUDAL; PERINEURAL
Status: DISPENSED
Start: 2021-12-20

## (undated) RX ORDER — ONDANSETRON 2 MG/ML
INJECTION INTRAMUSCULAR; INTRAVENOUS
Status: DISPENSED
Start: 2017-02-13

## (undated) RX ORDER — ROPIVACAINE HYDROCHLORIDE 2 MG/ML
INJECTION, SOLUTION EPIDURAL; INFILTRATION; PERINEURAL
Status: DISPENSED
Start: 2017-02-13

## (undated) RX ORDER — FENTANYL CITRATE 50 UG/ML
INJECTION, SOLUTION INTRAMUSCULAR; INTRAVENOUS
Status: DISPENSED
Start: 2017-02-13

## (undated) RX ORDER — PROPOFOL 10 MG/ML
INJECTION, EMULSION INTRAVENOUS
Status: DISPENSED
Start: 2017-02-13

## (undated) RX ORDER — MINERAL OIL
OIL (ML) MISCELLANEOUS
Status: DISPENSED
Start: 2021-12-20

## (undated) RX ORDER — ONDANSETRON 2 MG/ML
INJECTION INTRAMUSCULAR; INTRAVENOUS
Status: DISPENSED
Start: 2021-12-20

## (undated) RX ORDER — BUPIVACAINE HYDROCHLORIDE AND EPINEPHRINE 5; 5 MG/ML; UG/ML
INJECTION, SOLUTION EPIDURAL; INTRACAUDAL; PERINEURAL
Status: DISPENSED
Start: 2021-12-20

## (undated) RX ORDER — ACYCLOVIR 200 MG/1
CAPSULE ORAL
Status: DISPENSED
Start: 2017-02-13

## (undated) RX ORDER — ACETAMINOPHEN 325 MG/1
TABLET ORAL
Status: DISPENSED
Start: 2021-12-20

## (undated) RX ORDER — BUPIVACAINE HYDROCHLORIDE AND EPINEPHRINE 5; 5 MG/ML; UG/ML
INJECTION, SOLUTION EPIDURAL; INTRACAUDAL; PERINEURAL
Status: DISPENSED
Start: 2021-12-07

## (undated) RX ORDER — CEFAZOLIN SODIUM 2 G/100ML
INJECTION, SOLUTION INTRAVENOUS
Status: DISPENSED
Start: 2021-12-20

## (undated) RX ORDER — HYDROMORPHONE HYDROCHLORIDE 1 MG/ML
INJECTION, SOLUTION INTRAMUSCULAR; INTRAVENOUS; SUBCUTANEOUS
Status: DISPENSED
Start: 2017-02-13

## (undated) RX ORDER — HYDROMORPHONE HYDROCHLORIDE 1 MG/ML
INJECTION, SOLUTION INTRAMUSCULAR; INTRAVENOUS; SUBCUTANEOUS
Status: DISPENSED
Start: 2021-12-20

## (undated) RX ORDER — OXYCODONE HYDROCHLORIDE 5 MG/1
TABLET ORAL
Status: DISPENSED
Start: 2021-12-20

## (undated) RX ORDER — GLYCOPYRROLATE 0.2 MG/ML
INJECTION, SOLUTION INTRAMUSCULAR; INTRAVENOUS
Status: DISPENSED
Start: 2017-02-13

## (undated) RX ORDER — FENTANYL CITRATE 50 UG/ML
INJECTION, SOLUTION INTRAMUSCULAR; INTRAVENOUS
Status: DISPENSED
Start: 2021-12-20